# Patient Record
Sex: FEMALE | Race: WHITE | NOT HISPANIC OR LATINO | Employment: FULL TIME
[De-identification: names, ages, dates, MRNs, and addresses within clinical notes are randomized per-mention and may not be internally consistent; named-entity substitution may affect disease eponyms.]

---

## 2020-04-10 ENCOUNTER — TELEPHONE (OUTPATIENT)
Dept: SCHEDULING | Age: 35
End: 2020-04-10

## 2020-04-11 ENCOUNTER — APPOINTMENT (OUTPATIENT)
Dept: FAMILY MEDICINE | Age: 35
End: 2020-04-11

## 2020-04-11 ENCOUNTER — MED INFO FORMS (OUTPATIENT)
Dept: FAMILY MEDICINE | Age: 35
End: 2020-04-11

## 2020-04-11 PROBLEM — J45.909 ASTHMA (CMD): Status: ACTIVE | Noted: 2020-04-11

## 2020-04-11 PROBLEM — R20.2 HAND TINGLING: Status: ACTIVE | Noted: 2020-04-11

## 2020-04-11 RX ORDER — ALBUTEROL SULFATE 90 UG/1
2 AEROSOL, METERED RESPIRATORY (INHALATION) EVERY 4 HOURS PRN
COMMUNITY

## 2020-04-11 RX ORDER — FLUTICASONE PROPIONATE AND SALMETEROL 250; 50 UG/1; UG/1
1 POWDER RESPIRATORY (INHALATION)
COMMUNITY

## 2020-09-21 ENCOUNTER — HOSPITAL (OUTPATIENT)
Dept: OTHER | Age: 35
End: 2020-09-21
Attending: PHYSICIAN ASSISTANT

## 2020-12-15 ENCOUNTER — LAB SERVICES (OUTPATIENT)
Dept: LAB | Age: 35
End: 2020-12-15

## 2020-12-15 DIAGNOSIS — Z01.812 PRE-PROCEDURAL LABORATORY EXAMINATIONS: ICD-10-CM

## 2020-12-15 LAB
SARS-COV-2 RNA RESP QL NAA+PROBE: NOT DETECTED
SERVICE CMNT-IMP: NORMAL
SERVICE CMNT-IMP: NORMAL

## 2020-12-15 PROCEDURE — U0003 INFECTIOUS AGENT DETECTION BY NUCLEIC ACID (DNA OR RNA); SEVERE ACUTE RESPIRATORY SYNDROME CORONAVIRUS 2 (SARS-COV-2) (CORONAVIRUS DISEASE [COVID-19]), AMPLIFIED PROBE TECHNIQUE, MAKING USE OF HIGH THROUGHPUT TECHNOLOGIES AS DESCRIBED BY CMS-2020-01-R: HCPCS | Performed by: ORTHOPAEDIC SURGERY

## 2020-12-17 SDOH — HEALTH STABILITY: MENTAL HEALTH: HOW OFTEN DO YOU HAVE A DRINK CONTAINING ALCOHOL?: MONTHLY OR LESS

## 2020-12-17 SDOH — HEALTH STABILITY: MENTAL HEALTH: HOW MANY STANDARD DRINKS CONTAINING ALCOHOL DO YOU HAVE ON A TYPICAL DAY?: 1 OR 2

## 2020-12-18 ENCOUNTER — ANESTHESIA (OUTPATIENT)
Dept: SURGERY | Age: 35
End: 2020-12-18

## 2020-12-18 ENCOUNTER — HOSPITAL ENCOUNTER (OUTPATIENT)
Age: 35
Discharge: HOME | End: 2020-12-18
Attending: ORTHOPAEDIC SURGERY | Admitting: ORTHOPAEDIC SURGERY

## 2020-12-18 ENCOUNTER — ANESTHESIA EVENT (OUTPATIENT)
Dept: SURGERY | Age: 35
End: 2020-12-18

## 2020-12-18 VITALS
HEIGHT: 72 IN | BODY MASS INDEX: 36.19 KG/M2 | RESPIRATION RATE: 16 BRPM | TEMPERATURE: 97 F | HEART RATE: 68 BPM | SYSTOLIC BLOOD PRESSURE: 118 MMHG | DIASTOLIC BLOOD PRESSURE: 87 MMHG | WEIGHT: 267.2 LBS | OXYGEN SATURATION: 98 %

## 2020-12-18 DIAGNOSIS — Z01.812 PRE-PROCEDURAL LABORATORY EXAMINATIONS: Primary | ICD-10-CM

## 2020-12-18 LAB
B-HCG UR QL: NEGATIVE
INTERNAL PROCEDURAL CONTROLS ACCEPTABLE: YES

## 2020-12-18 PROCEDURE — 13000114 HB ORTHO BASIC CASE S/U + 1ST 15 MIN: Performed by: ORTHOPAEDIC SURGERY

## 2020-12-18 PROCEDURE — 13000002 HB ANESTHESIA  GENERAL  S/U + 1ST 15 MIN: Performed by: ORTHOPAEDIC SURGERY

## 2020-12-18 PROCEDURE — 10002801 HB RX 250 W/O HCPCS: Performed by: INTERNAL MEDICINE

## 2020-12-18 PROCEDURE — 10004451 HB PACU RECOVERY 1ST 30 MINUTES: Performed by: ORTHOPAEDIC SURGERY

## 2020-12-18 PROCEDURE — 13000001 HB PHASE II RECOVERY EA 30 MINUTES: Performed by: ORTHOPAEDIC SURGERY

## 2020-12-18 PROCEDURE — 13000115 HB ORTHO BASIC CASE EA ADD MINUTE: Performed by: ORTHOPAEDIC SURGERY

## 2020-12-18 PROCEDURE — 10002801 HB RX 250 W/O HCPCS: Performed by: ORTHOPAEDIC SURGERY

## 2020-12-18 PROCEDURE — 10006023 HB SUPPLY 272: Performed by: ORTHOPAEDIC SURGERY

## 2020-12-18 PROCEDURE — 10002807 HB RX 258: Performed by: INTERNAL MEDICINE

## 2020-12-18 PROCEDURE — 10004452 HB PACU ADDL 30 MINUTES: Performed by: ORTHOPAEDIC SURGERY

## 2020-12-18 PROCEDURE — 13000003 HB ANESTHESIA  GENERAL EA ADD MINUTE: Performed by: ORTHOPAEDIC SURGERY

## 2020-12-18 PROCEDURE — 10002800 HB RX 250 W HCPCS: Performed by: INTERNAL MEDICINE

## 2020-12-18 PROCEDURE — 81025 URINE PREGNANCY TEST: CPT | Performed by: ORTHOPAEDIC SURGERY

## 2020-12-18 RX ORDER — ACETAMINOPHEN 650 MG/1
650 SUPPOSITORY RECTAL EVERY 4 HOURS PRN
Status: DISCONTINUED | OUTPATIENT
Start: 2020-12-18 | End: 2020-12-18 | Stop reason: HOSPADM

## 2020-12-18 RX ORDER — LIDOCAINE HYDROCHLORIDE 10 MG/ML
INJECTION, SOLUTION INFILTRATION; PERINEURAL PRN
Status: DISCONTINUED | OUTPATIENT
Start: 2020-12-18 | End: 2020-12-18 | Stop reason: HOSPADM

## 2020-12-18 RX ORDER — DEXAMETHASONE SODIUM PHOSPHATE 4 MG/ML
INJECTION, SOLUTION INTRA-ARTICULAR; INTRALESIONAL; INTRAMUSCULAR; INTRAVENOUS; SOFT TISSUE PRN
Status: DISCONTINUED | OUTPATIENT
Start: 2020-12-18 | End: 2020-12-18

## 2020-12-18 RX ORDER — PROPOFOL 10 MG/ML
INJECTION, EMULSION INTRAVENOUS PRN
Status: DISCONTINUED | OUTPATIENT
Start: 2020-12-18 | End: 2020-12-18

## 2020-12-18 RX ORDER — SODIUM CHLORIDE, SODIUM LACTATE, POTASSIUM CHLORIDE, CALCIUM CHLORIDE 600; 310; 30; 20 MG/100ML; MG/100ML; MG/100ML; MG/100ML
10 INJECTION, SOLUTION INTRAVENOUS CONTINUOUS
Status: DISCONTINUED | OUTPATIENT
Start: 2020-12-18 | End: 2020-12-18 | Stop reason: HOSPADM

## 2020-12-18 RX ORDER — LIDOCAINE HYDROCHLORIDE 20 MG/ML
INJECTION, SOLUTION INFILTRATION; PERINEURAL PRN
Status: DISCONTINUED | OUTPATIENT
Start: 2020-12-18 | End: 2020-12-18

## 2020-12-18 RX ORDER — HYDROCODONE BITARTRATE AND ACETAMINOPHEN 5; 325 MG/1; MG/1
1 TABLET ORAL EVERY 6 HOURS PRN
Qty: 15 TABLET | Refills: 0 | Status: ON HOLD | OUTPATIENT
Start: 2020-12-18 | End: 2021-02-26 | Stop reason: HOSPADM

## 2020-12-18 RX ORDER — MIDAZOLAM HYDROCHLORIDE 1 MG/ML
INJECTION, SOLUTION INTRAMUSCULAR; INTRAVENOUS PRN
Status: DISCONTINUED | OUTPATIENT
Start: 2020-12-18 | End: 2020-12-18

## 2020-12-18 RX ORDER — PROCHLORPERAZINE EDISYLATE 5 MG/ML
5 INJECTION INTRAMUSCULAR; INTRAVENOUS EVERY 4 HOURS PRN
Status: DISCONTINUED | OUTPATIENT
Start: 2020-12-18 | End: 2020-12-18 | Stop reason: HOSPADM

## 2020-12-18 RX ORDER — BUPIVACAINE HYDROCHLORIDE 5 MG/ML
INJECTION, SOLUTION EPIDURAL; INTRACAUDAL PRN
Status: DISCONTINUED | OUTPATIENT
Start: 2020-12-18 | End: 2020-12-18 | Stop reason: HOSPADM

## 2020-12-18 RX ORDER — SODIUM CHLORIDE, SODIUM LACTATE, POTASSIUM CHLORIDE, CALCIUM CHLORIDE 600; 310; 30; 20 MG/100ML; MG/100ML; MG/100ML; MG/100ML
INJECTION, SOLUTION INTRAVENOUS CONTINUOUS PRN
Status: DISCONTINUED | OUTPATIENT
Start: 2020-12-18 | End: 2020-12-18

## 2020-12-18 RX ORDER — ACETAMINOPHEN 325 MG/1
650 TABLET ORAL EVERY 4 HOURS PRN
Status: DISCONTINUED | OUTPATIENT
Start: 2020-12-18 | End: 2020-12-18 | Stop reason: HOSPADM

## 2020-12-18 RX ORDER — ROCURONIUM BROMIDE 10 MG/ML
INJECTION, SOLUTION INTRAVENOUS PRN
Status: DISCONTINUED | OUTPATIENT
Start: 2020-12-18 | End: 2020-12-18

## 2020-12-18 RX ORDER — ONDANSETRON 2 MG/ML
INJECTION INTRAMUSCULAR; INTRAVENOUS PRN
Status: DISCONTINUED | OUTPATIENT
Start: 2020-12-18 | End: 2020-12-18

## 2020-12-18 RX ADMIN — PROPOFOL 200 MG: 10 INJECTION, EMULSION INTRAVENOUS at 10:38

## 2020-12-18 RX ADMIN — LIDOCAINE HYDROCHLORIDE 5 ML: 20 INJECTION, SOLUTION INFILTRATION; PERINEURAL at 10:38

## 2020-12-18 RX ADMIN — DEXAMETHASONE SODIUM PHOSPHATE 4 MG: 4 INJECTION, SOLUTION INTRAMUSCULAR; INTRAVENOUS at 10:44

## 2020-12-18 RX ADMIN — Medication 3000 MG: at 10:37

## 2020-12-18 RX ADMIN — MIDAZOLAM HYDROCHLORIDE 2 MG: 1 INJECTION, SOLUTION INTRAMUSCULAR; INTRAVENOUS at 10:30

## 2020-12-18 RX ADMIN — Medication 100 MG: at 10:38

## 2020-12-18 RX ADMIN — FENTANYL CITRATE 50 MCG: 50 INJECTION, SOLUTION INTRAMUSCULAR; INTRAVENOUS at 10:38

## 2020-12-18 RX ADMIN — ROCURONIUM BROMIDE 10 MG: 50 INJECTION, SOLUTION INTRAVENOUS at 10:38

## 2020-12-18 RX ADMIN — SODIUM CHLORIDE, POTASSIUM CHLORIDE, SODIUM LACTATE AND CALCIUM CHLORIDE: 600; 310; 30; 20 INJECTION, SOLUTION INTRAVENOUS at 10:30

## 2020-12-18 RX ADMIN — ONDANSETRON 4 MG: 2 INJECTION INTRAMUSCULAR; INTRAVENOUS at 11:17

## 2020-12-18 SDOH — HEALTH STABILITY: MENTAL HEALTH: HOW MANY STANDARD DRINKS CONTAINING ALCOHOL DO YOU HAVE ON A TYPICAL DAY?: 1 OR 2

## 2020-12-18 SDOH — HEALTH STABILITY: MENTAL HEALTH: HOW OFTEN DO YOU HAVE A DRINK CONTAINING ALCOHOL?: MONTHLY OR LESS

## 2020-12-18 ASSESSMENT — PAIN SCALES - GENERAL
PAINLEVEL_OUTOF10: 3
PAINLEVEL_OUTOF10: 2
PAINLEVEL_OUTOF10: 3

## 2021-02-23 ENCOUNTER — LAB SERVICES (OUTPATIENT)
Dept: LAB | Age: 36
End: 2021-02-23

## 2021-02-23 DIAGNOSIS — Z01.812 PRE-PROCEDURAL LABORATORY EXAMINATIONS: ICD-10-CM

## 2021-02-23 LAB
SARS-COV-2 RNA RESP QL NAA+PROBE: NOT DETECTED
SERVICE CMNT-IMP: NORMAL
SERVICE CMNT-IMP: NORMAL

## 2021-02-23 PROCEDURE — U0005 INFEC AGEN DETEC AMPLI PROBE: HCPCS | Performed by: ORTHOPAEDIC SURGERY

## 2021-02-23 PROCEDURE — U0003 INFECTIOUS AGENT DETECTION BY NUCLEIC ACID (DNA OR RNA); SEVERE ACUTE RESPIRATORY SYNDROME CORONAVIRUS 2 (SARS-COV-2) (CORONAVIRUS DISEASE [COVID-19]), AMPLIFIED PROBE TECHNIQUE, MAKING USE OF HIGH THROUGHPUT TECHNOLOGIES AS DESCRIBED BY CMS-2020-01-R: HCPCS | Performed by: ORTHOPAEDIC SURGERY

## 2021-02-25 ENCOUNTER — ANESTHESIA EVENT (OUTPATIENT)
Dept: SURGERY | Age: 36
End: 2021-02-25

## 2021-02-25 RX ORDER — SODIUM CHLORIDE, SODIUM LACTATE, POTASSIUM CHLORIDE, CALCIUM CHLORIDE 600; 310; 30; 20 MG/100ML; MG/100ML; MG/100ML; MG/100ML
10 INJECTION, SOLUTION INTRAVENOUS CONTINUOUS
Status: CANCELLED | OUTPATIENT
Start: 2021-02-25

## 2021-02-25 SDOH — HEALTH STABILITY: MENTAL HEALTH: HOW MANY STANDARD DRINKS CONTAINING ALCOHOL DO YOU HAVE ON A TYPICAL DAY?: 1 OR 2

## 2021-02-25 SDOH — HEALTH STABILITY: MENTAL HEALTH: HOW OFTEN DO YOU HAVE A DRINK CONTAINING ALCOHOL?: MONTHLY OR LESS

## 2021-02-25 ASSESSMENT — ACTIVITIES OF DAILY LIVING (ADL)
ADL_SCORE: 12
RECENT_DECLINE_ADL: NO
ADL_BEFORE_ADMISSION: INDEPENDENT
HISTORY OF FALLING IN THE LAST YEAR (PRIOR TO ADMISSION): NO
CHRONIC_PAIN_PRESENT: NO
SENSORY_SUPPORT_DEVICES: EYEGLASSES
ADL_SHORT_OF_BREATH: NO
NEEDS_ASSIST: NO

## 2021-02-25 ASSESSMENT — COGNITIVE AND FUNCTIONAL STATUS - GENERAL: ARE YOU DEAF OR DO YOU HAVE SERIOUS DIFFICULTY  HEARING: NO

## 2021-02-26 ENCOUNTER — HOSPITAL ENCOUNTER (OUTPATIENT)
Age: 36
Discharge: HOME | End: 2021-02-26
Attending: ORTHOPAEDIC SURGERY | Admitting: ORTHOPAEDIC SURGERY

## 2021-02-26 ENCOUNTER — ANESTHESIA (OUTPATIENT)
Dept: SURGERY | Age: 36
End: 2021-02-26

## 2021-02-26 DIAGNOSIS — Z01.812 PRE-PROCEDURAL LABORATORY EXAMINATIONS: Primary | ICD-10-CM

## 2021-02-26 LAB
B-HCG UR QL: NEGATIVE
INTERNAL PROCEDURAL CONTROLS ACCEPTABLE: YES

## 2021-02-26 PROCEDURE — 13000115 HB ORTHO BASIC CASE EA ADD MINUTE: Performed by: ORTHOPAEDIC SURGERY

## 2021-02-26 PROCEDURE — 13000002 HB ANESTHESIA  GENERAL  S/U + 1ST 15 MIN: Performed by: ORTHOPAEDIC SURGERY

## 2021-02-26 PROCEDURE — 10002800 HB RX 250 W HCPCS: Performed by: STUDENT IN AN ORGANIZED HEALTH CARE EDUCATION/TRAINING PROGRAM

## 2021-02-26 PROCEDURE — 13000003 HB ANESTHESIA  GENERAL EA ADD MINUTE: Performed by: ORTHOPAEDIC SURGERY

## 2021-02-26 PROCEDURE — 81025 URINE PREGNANCY TEST: CPT | Performed by: ORTHOPAEDIC SURGERY

## 2021-02-26 PROCEDURE — 10002807 HB RX 258: Performed by: STUDENT IN AN ORGANIZED HEALTH CARE EDUCATION/TRAINING PROGRAM

## 2021-02-26 PROCEDURE — 10004451 HB PACU RECOVERY 1ST 30 MINUTES: Performed by: ORTHOPAEDIC SURGERY

## 2021-02-26 PROCEDURE — 10002801 HB RX 250 W/O HCPCS: Performed by: STUDENT IN AN ORGANIZED HEALTH CARE EDUCATION/TRAINING PROGRAM

## 2021-02-26 PROCEDURE — 13000001 HB PHASE II RECOVERY EA 30 MINUTES: Performed by: ORTHOPAEDIC SURGERY

## 2021-02-26 PROCEDURE — 10002801 HB RX 250 W/O HCPCS: Performed by: ORTHOPAEDIC SURGERY

## 2021-02-26 PROCEDURE — 13000114 HB ORTHO BASIC CASE S/U + 1ST 15 MIN: Performed by: ORTHOPAEDIC SURGERY

## 2021-02-26 PROCEDURE — 10006023 HB SUPPLY 272: Performed by: ORTHOPAEDIC SURGERY

## 2021-02-26 PROCEDURE — 10004452 HB PACU ADDL 30 MINUTES: Performed by: ORTHOPAEDIC SURGERY

## 2021-02-26 RX ORDER — HYDROCODONE BITARTRATE AND ACETAMINOPHEN 5; 325 MG/1; MG/1
1 TABLET ORAL EVERY 6 HOURS PRN
Qty: 15 TABLET | Refills: 0 | Status: SHIPPED | OUTPATIENT
Start: 2021-02-26 | End: 2023-06-01 | Stop reason: ALTCHOICE

## 2021-02-26 RX ORDER — ONDANSETRON 2 MG/ML
INJECTION INTRAMUSCULAR; INTRAVENOUS PRN
Status: DISCONTINUED | OUTPATIENT
Start: 2021-02-26 | End: 2021-02-26

## 2021-02-26 RX ORDER — SODIUM CHLORIDE, SODIUM LACTATE, POTASSIUM CHLORIDE, CALCIUM CHLORIDE 600; 310; 30; 20 MG/100ML; MG/100ML; MG/100ML; MG/100ML
INJECTION, SOLUTION INTRAVENOUS CONTINUOUS PRN
Status: DISCONTINUED | OUTPATIENT
Start: 2021-02-26 | End: 2021-02-26

## 2021-02-26 RX ORDER — LIDOCAINE HYDROCHLORIDE 20 MG/ML
INJECTION, SOLUTION INFILTRATION; PERINEURAL PRN
Status: DISCONTINUED | OUTPATIENT
Start: 2021-02-26 | End: 2021-02-26

## 2021-02-26 RX ORDER — PROCHLORPERAZINE EDISYLATE 5 MG/ML
5 INJECTION INTRAMUSCULAR; INTRAVENOUS EVERY 4 HOURS PRN
Status: DISCONTINUED | OUTPATIENT
Start: 2021-02-26 | End: 2021-02-26 | Stop reason: HOSPADM

## 2021-02-26 RX ORDER — PROPOFOL 10 MG/ML
INJECTION, EMULSION INTRAVENOUS PRN
Status: DISCONTINUED | OUTPATIENT
Start: 2021-02-26 | End: 2021-02-26

## 2021-02-26 RX ORDER — MIDAZOLAM HYDROCHLORIDE 1 MG/ML
INJECTION, SOLUTION INTRAMUSCULAR; INTRAVENOUS PRN
Status: DISCONTINUED | OUTPATIENT
Start: 2021-02-26 | End: 2021-02-26

## 2021-02-26 RX ORDER — BUPIVACAINE HYDROCHLORIDE 5 MG/ML
INJECTION, SOLUTION EPIDURAL; INTRACAUDAL PRN
Status: DISCONTINUED | OUTPATIENT
Start: 2021-02-26 | End: 2021-02-26 | Stop reason: HOSPADM

## 2021-02-26 RX ORDER — DEXAMETHASONE SODIUM PHOSPHATE 4 MG/ML
INJECTION, SOLUTION INTRA-ARTICULAR; INTRALESIONAL; INTRAMUSCULAR; INTRAVENOUS; SOFT TISSUE PRN
Status: DISCONTINUED | OUTPATIENT
Start: 2021-02-26 | End: 2021-02-26

## 2021-02-26 RX ADMIN — PROPOFOL 200 MG: 10 INJECTION, EMULSION INTRAVENOUS at 07:34

## 2021-02-26 RX ADMIN — SODIUM CHLORIDE, POTASSIUM CHLORIDE, SODIUM LACTATE AND CALCIUM CHLORIDE: 600; 310; 30; 20 INJECTION, SOLUTION INTRAVENOUS at 07:25

## 2021-02-26 RX ADMIN — FENTANYL CITRATE 25 MCG: 50 INJECTION, SOLUTION INTRAMUSCULAR; INTRAVENOUS at 07:41

## 2021-02-26 RX ADMIN — Medication 30 MG: at 07:52

## 2021-02-26 RX ADMIN — FENTANYL CITRATE 50 MCG: 50 INJECTION, SOLUTION INTRAMUSCULAR; INTRAVENOUS at 07:46

## 2021-02-26 RX ADMIN — Medication 3000 MG: at 07:43

## 2021-02-26 RX ADMIN — DEXAMETHASONE SODIUM PHOSPHATE 4 MG: 4 INJECTION, SOLUTION INTRAMUSCULAR; INTRAVENOUS at 07:52

## 2021-02-26 RX ADMIN — MIDAZOLAM HYDROCHLORIDE 2 MG: 1 INJECTION, SOLUTION INTRAMUSCULAR; INTRAVENOUS at 07:28

## 2021-02-26 RX ADMIN — FENTANYL CITRATE 25 MCG: 50 INJECTION, SOLUTION INTRAMUSCULAR; INTRAVENOUS at 07:34

## 2021-02-26 RX ADMIN — ONDANSETRON 4 MG: 2 INJECTION INTRAMUSCULAR; INTRAVENOUS at 07:52

## 2021-02-26 RX ADMIN — LIDOCAINE HYDROCHLORIDE 5 ML: 20 INJECTION, SOLUTION INFILTRATION; PERINEURAL at 07:34

## 2021-02-26 SDOH — HEALTH STABILITY: MENTAL HEALTH: HOW MANY STANDARD DRINKS CONTAINING ALCOHOL DO YOU HAVE ON A TYPICAL DAY?: 1 OR 2

## 2021-02-26 SDOH — HEALTH STABILITY: MENTAL HEALTH: HOW OFTEN DO YOU HAVE A DRINK CONTAINING ALCOHOL?: MONTHLY OR LESS

## 2021-02-26 ASSESSMENT — PAIN SCALES - GENERAL
PAINLEVEL_OUTOF10: 0
PAINLEVEL_OUTOF10: 0

## 2021-02-26 ASSESSMENT — ENCOUNTER SYMPTOMS: EXERCISE TOLERANCE: GOOD (>4 METS)

## 2021-02-26 ASSESSMENT — COPD QUESTIONNAIRES: COPD: 0

## 2021-05-26 VITALS
HEIGHT: 70 IN | SYSTOLIC BLOOD PRESSURE: 115 MMHG | OXYGEN SATURATION: 98 % | BODY MASS INDEX: 40.94 KG/M2 | WEIGHT: 285.94 LBS | HEART RATE: 70 BPM | TEMPERATURE: 96.8 F | DIASTOLIC BLOOD PRESSURE: 86 MMHG | RESPIRATION RATE: 15 BRPM

## 2023-06-01 ENCOUNTER — IMAGING SERVICES (OUTPATIENT)
Dept: GENERAL RADIOLOGY | Age: 38
End: 2023-06-01
Attending: NURSE PRACTITIONER

## 2023-06-01 ENCOUNTER — WALK IN (OUTPATIENT)
Dept: URGENT CARE | Age: 38
End: 2023-06-01

## 2023-06-01 VITALS
DIASTOLIC BLOOD PRESSURE: 76 MMHG | SYSTOLIC BLOOD PRESSURE: 112 MMHG | TEMPERATURE: 98.2 F | OXYGEN SATURATION: 98 % | HEART RATE: 68 BPM | RESPIRATION RATE: 18 BRPM

## 2023-06-01 DIAGNOSIS — S89.91XA INJURY OF RIGHT KNEE, INITIAL ENCOUNTER: ICD-10-CM

## 2023-06-01 DIAGNOSIS — R22.0 HEAD LUMP: ICD-10-CM

## 2023-06-01 DIAGNOSIS — S80.01XA CONTUSION OF RIGHT KNEE, INITIAL ENCOUNTER: Primary | ICD-10-CM

## 2023-06-01 PROCEDURE — 99203 OFFICE O/P NEW LOW 30 MIN: CPT | Performed by: NURSE PRACTITIONER

## 2023-06-01 ASSESSMENT — ENCOUNTER SYMPTOMS
EYES NEGATIVE: 1
NEUROLOGICAL NEGATIVE: 1
PSYCHIATRIC NEGATIVE: 1
CONSTITUTIONAL NEGATIVE: 1
RESPIRATORY NEGATIVE: 1
GASTROINTESTINAL NEGATIVE: 1

## 2023-06-03 ENCOUNTER — EXTERNAL RECORD (OUTPATIENT)
Dept: HEALTH INFORMATION MANAGEMENT | Facility: OTHER | Age: 38
End: 2023-06-03

## 2023-06-26 ASSESSMENT — ENCOUNTER SYMPTOMS
NERVOUS/ANXIOUS: 0
SORE THROAT: 0
EYE PAIN: 0
ABDOMINAL PAIN: 0
NUMBNESS: 0
WEAKNESS: 0
AGITATION: 0
FATIGUE: 0
WOUND: 0
SLEEP DISTURBANCE: 0
CHILLS: 0
DIARRHEA: 0
DIZZINESS: 0
HEADACHES: 0
WHEEZING: 0
CONSTIPATION: 0
LIGHT-HEADEDNESS: 0
FEVER: 0
SHORTNESS OF BREATH: 0
VOMITING: 0
NAUSEA: 0
CHEST TIGHTNESS: 0
TROUBLE SWALLOWING: 0
BACK PAIN: 0

## 2023-06-28 ENCOUNTER — APPOINTMENT (OUTPATIENT)
Dept: SPORTS MEDICINE | Age: 38
End: 2023-06-28

## 2023-06-29 ASSESSMENT — ENCOUNTER SYMPTOMS
ABDOMINAL PAIN: 0
BACK PAIN: 0
DIZZINESS: 0
SLEEP DISTURBANCE: 0
CHILLS: 0
EYE PAIN: 0
VOMITING: 0
FATIGUE: 0
NERVOUS/ANXIOUS: 0
TROUBLE SWALLOWING: 0
CHEST TIGHTNESS: 0
CONSTIPATION: 0
FEVER: 0
NAUSEA: 0
WOUND: 0
DIARRHEA: 0
NUMBNESS: 0
AGITATION: 0
WHEEZING: 0
LIGHT-HEADEDNESS: 0
HEADACHES: 0
SHORTNESS OF BREATH: 0
SORE THROAT: 0
WEAKNESS: 0

## 2023-07-05 ENCOUNTER — APPOINTMENT (OUTPATIENT)
Dept: SPORTS MEDICINE | Age: 38
End: 2023-07-05

## 2023-07-14 ASSESSMENT — ENCOUNTER SYMPTOMS
CONSTIPATION: 0
BACK PAIN: 0
ABDOMINAL PAIN: 0
UNEXPECTED WEIGHT CHANGE: 0
BLOOD IN STOOL: 0
EYE PAIN: 0
NUMBNESS: 0
CHEST TIGHTNESS: 0
HEADACHES: 0
SINUS PAIN: 0
EYE REDNESS: 0
SHORTNESS OF BREATH: 0
CHILLS: 0
NAUSEA: 0
AGITATION: 0
ACTIVITY CHANGE: 0
FATIGUE: 0
DIAPHORESIS: 0
DIZZINESS: 0
SPEECH DIFFICULTY: 0
CHOKING: 0
PHOTOPHOBIA: 0
RHINORRHEA: 0
COUGH: 0
NERVOUS/ANXIOUS: 0

## 2023-07-18 ASSESSMENT — ENCOUNTER SYMPTOMS
NAUSEA: 0
WHEEZING: 0
WOUND: 0
AGITATION: 0
SLEEP DISTURBANCE: 0
WEAKNESS: 0
FEVER: 0
HEADACHES: 0
DIZZINESS: 0
SHORTNESS OF BREATH: 0
EYE PAIN: 0
NERVOUS/ANXIOUS: 0
BACK PAIN: 0
SORE THROAT: 0
TROUBLE SWALLOWING: 0
ABDOMINAL PAIN: 0
CHILLS: 0
CHEST TIGHTNESS: 0
CONSTIPATION: 0
DIARRHEA: 0
LIGHT-HEADEDNESS: 0
VOMITING: 0
NUMBNESS: 0
FATIGUE: 0

## 2023-07-19 ENCOUNTER — APPOINTMENT (OUTPATIENT)
Dept: SPORTS MEDICINE | Age: 38
End: 2023-07-19

## 2023-08-01 ASSESSMENT — ENCOUNTER SYMPTOMS
VOMITING: 0
SLEEP DISTURBANCE: 0
WOUND: 0
FEVER: 0
WEAKNESS: 0
HEADACHES: 0
TROUBLE SWALLOWING: 0
LIGHT-HEADEDNESS: 0
SHORTNESS OF BREATH: 0
DIARRHEA: 0
ABDOMINAL PAIN: 0
BACK PAIN: 0
NERVOUS/ANXIOUS: 0
NAUSEA: 0
FATIGUE: 0
DIZZINESS: 0
CHILLS: 0
CONSTIPATION: 0
AGITATION: 0
NUMBNESS: 0
EYE PAIN: 0
SORE THROAT: 0
CHEST TIGHTNESS: 0
WHEEZING: 0

## 2023-08-03 ENCOUNTER — APPOINTMENT (OUTPATIENT)
Dept: SPORTS MEDICINE | Age: 38
End: 2023-08-03

## 2023-08-11 ASSESSMENT — ENCOUNTER SYMPTOMS
TROUBLE SWALLOWING: 0
CHEST TIGHTNESS: 0
DIARRHEA: 0
HEADACHES: 0
ABDOMINAL PAIN: 0
FATIGUE: 0
SLEEP DISTURBANCE: 0
WHEEZING: 0
NUMBNESS: 0
FEVER: 0
VOMITING: 0
SORE THROAT: 0
BACK PAIN: 0
SHORTNESS OF BREATH: 0
CONSTIPATION: 0
AGITATION: 0
WEAKNESS: 0
EYE PAIN: 0
WOUND: 0
DIZZINESS: 0
CHILLS: 0
NERVOUS/ANXIOUS: 0
LIGHT-HEADEDNESS: 0
NAUSEA: 0

## 2023-08-16 ENCOUNTER — OFFICE VISIT (OUTPATIENT)
Dept: SPORTS MEDICINE | Age: 38
End: 2023-08-16

## 2023-08-16 VITALS — HEIGHT: 70 IN | WEIGHT: 230 LBS | BODY MASS INDEX: 32.93 KG/M2

## 2023-08-16 DIAGNOSIS — S83.231A COMPLEX TEAR OF MEDIAL MENISCUS OF RIGHT KNEE AS CURRENT INJURY, INITIAL ENCOUNTER: ICD-10-CM

## 2023-08-16 DIAGNOSIS — M25.561 ACUTE PAIN OF RIGHT KNEE: Primary | ICD-10-CM

## 2023-08-16 DIAGNOSIS — M17.11 PRIMARY OSTEOARTHRITIS OF RIGHT KNEE: ICD-10-CM

## 2023-08-16 PROCEDURE — 99204 OFFICE O/P NEW MOD 45 MIN: CPT | Performed by: FAMILY MEDICINE

## 2024-02-22 ENCOUNTER — OFFICE VISIT (OUTPATIENT)
Dept: FAMILY MEDICINE CLINIC | Facility: CLINIC | Age: 39
End: 2024-02-22

## 2024-02-22 ENCOUNTER — HOSPITAL ENCOUNTER (OUTPATIENT)
Dept: GENERAL RADIOLOGY | Age: 39
Discharge: HOME | End: 2024-02-22
Attending: PHYSICIAN ASSISTANT
Payer: COMMERCIAL

## 2024-02-22 VITALS
DIASTOLIC BLOOD PRESSURE: 81 MMHG | BODY MASS INDEX: 36.54 KG/M2 | WEIGHT: 261 LBS | HEIGHT: 70.67 IN | SYSTOLIC BLOOD PRESSURE: 119 MMHG | HEART RATE: 68 BPM

## 2024-02-22 DIAGNOSIS — M25.561 ACUTE PAIN OF RIGHT KNEE: Primary | ICD-10-CM

## 2024-02-22 DIAGNOSIS — I83.90 VARICOSE VEIN: ICD-10-CM

## 2024-02-22 DIAGNOSIS — M25.561 ACUTE PAIN OF RIGHT KNEE: ICD-10-CM

## 2024-02-22 PROCEDURE — 73562 X-RAY EXAM OF KNEE 3: CPT | Performed by: PHYSICIAN ASSISTANT

## 2024-02-22 PROCEDURE — 99202 OFFICE O/P NEW SF 15 MIN: CPT | Performed by: PHYSICIAN ASSISTANT

## 2024-02-22 PROCEDURE — 3008F BODY MASS INDEX DOCD: CPT | Performed by: PHYSICIAN ASSISTANT

## 2024-02-22 PROCEDURE — 3079F DIAST BP 80-89 MM HG: CPT | Performed by: PHYSICIAN ASSISTANT

## 2024-02-22 PROCEDURE — 3074F SYST BP LT 130 MM HG: CPT | Performed by: PHYSICIAN ASSISTANT

## 2024-02-22 RX ORDER — FLUTICASONE PROPIONATE AND SALMETEROL 250; 50 UG/1; UG/1
1 POWDER RESPIRATORY (INHALATION) 2 TIMES DAILY
COMMUNITY

## 2024-02-22 RX ORDER — ALBUTEROL SULFATE 90 UG/1
2 AEROSOL, METERED RESPIRATORY (INHALATION) EVERY 4 HOURS PRN
COMMUNITY

## 2024-02-22 RX ORDER — NAPROXEN 500 MG/1
500 TABLET ORAL 2 TIMES DAILY WITH MEALS
Qty: 60 TABLET | Refills: 0 | Status: SHIPPED | OUTPATIENT
Start: 2024-02-22

## 2024-02-22 NOTE — PROGRESS NOTES
HPI:     HPI  38 year-old female is here in the office complaining of right leg pain for the past 2 weeks. The pain radiates down to calf. Patient denies of fever, leg swelling, redness, numbness and tingling sensation, weakness.    Medications:     Current Outpatient Medications   Medication Sig Dispense Refill    albuterol 108 (90 Base) MCG/ACT Inhalation Aero Soln Inhale 2 puffs into the lungs every 4 (four) hours as needed.      naproxen 500 MG Oral Tab Take 1 tablet (500 mg total) by mouth 2 (two) times daily with meals. 60 tablet 0    fluticasone-salmeterol 250-50 MCG/ACT Inhalation Aerosol Powder, Breath Activated Inhale 1 puff into the lungs 2 (two) times daily. (Patient not taking: Reported on 2/22/2024)         Allergies:   No Known Allergies    History:     Health Maintenance   Topic Date Due    Annual Physical  Never done    Pap Smear  Never done    Annual Depression Screening  Never done    Influenza Vaccine (1) 06/30/2024 (Originally 10/1/2023)    DTaP,Tdap,and Td Vaccines (2 - Td or Tdap) 01/11/2026    COVID-19 Vaccine  Completed    Pneumococcal Vaccine: Birth to 64yrs  Aged Out       Patient's last menstrual period was 02/12/2024 (approximate).   Past Medical History:   History reviewed. No pertinent past medical history.    Past Surgical History:   History reviewed. No pertinent surgical history.    Family History:   History reviewed. No pertinent family history.    Social History:     Social History     Socioeconomic History    Marital status: Unknown     Spouse name: Not on file    Number of children: Not on file    Years of education: Not on file    Highest education level: Not on file   Occupational History    Not on file   Tobacco Use    Smoking status: Never    Smokeless tobacco: Never   Vaping Use    Vaping Use: Never used   Substance and Sexual Activity    Alcohol use: Yes     Comment: rarely    Drug use: Never    Sexual activity: Not on file   Other Topics Concern    Not on file   Social  History Narrative    Not on file     Social Determinants of Health     Financial Resource Strain: Not on file   Food Insecurity: Not on file   Transportation Needs: Not on file   Physical Activity: Not on file   Stress: Not on file   Social Connections: Not on file   Housing Stability: Not on file       Review of Systems:   Review of Systems   + right leg pain.    Vitals:    02/22/24 1335   BP: 119/81   Pulse: 68   Weight: 261 lb (118.4 kg)   Height: 5' 10.67\" (1.795 m)     Body mass index is 36.74 kg/m².    Physical Exam:   Physical Exam  Vitals reviewed.      There are varicose veins on the back of leg.     Assessment and Plan::     Problem List Items Addressed This Visit    None  Visit Diagnoses       Acute pain of right knee    -  Primary    Relevant Medications    naproxen 500 MG Oral Tab    Other Relevant Orders    XR KNEE (3 VIEWS), RIGHT (CPT=73562) (Completed)    Varicose vein        Relevant Orders    Vascular Surgery - In Network          Discussed plan of care with pt and pt is in agreement.All questions answered. Pt to call with questions or concerns.

## 2024-03-04 ENCOUNTER — PATIENT MESSAGE (OUTPATIENT)
Dept: FAMILY MEDICINE CLINIC | Facility: CLINIC | Age: 39
End: 2024-03-04

## 2024-03-04 NOTE — TELEPHONE ENCOUNTER
From: Salome Salamanca  To: Lavell Rivas  Sent: 3/4/2024 3:32 PM CST  Subject: Referral    Hi    I called to schedule the cardio vascular appointment and the office said they did not have the referral. The fax number is [phone] to [facility].

## 2024-03-13 ENCOUNTER — OFFICE VISIT (OUTPATIENT)
Dept: PODIATRY CLINIC | Facility: CLINIC | Age: 39
End: 2024-03-13

## 2024-03-13 DIAGNOSIS — M79.671 RIGHT FOOT PAIN: ICD-10-CM

## 2024-03-13 DIAGNOSIS — M21.6X1 EQUINUS DEFORMITY OF RIGHT FOOT: ICD-10-CM

## 2024-03-13 DIAGNOSIS — M72.2 PLANTAR FASCIITIS OF RIGHT FOOT: Primary | ICD-10-CM

## 2024-03-13 PROCEDURE — 99203 OFFICE O/P NEW LOW 30 MIN: CPT | Performed by: PODIATRIST

## 2024-03-13 NOTE — PROGRESS NOTES
[facility]  Progress Note    Salome Salamanca is a 38 year old female.   Chief Complaint   Patient presents with    Heel Pain     New pt- R heel- stated plantar fascitis- rates pain 6-8/10 all the time         HPI:     This is a pleasant female that presents to clinic today due to right plantar fascitis pain.  She was prescribed medrol dose pack by a different podiatrist which helped for a few weeks.  She did try night splint as well which helps slightly.   She is taking naproxen which does help.        Allergies: Patient has no known allergies.   Current Outpatient Medications   Medication Sig Dispense Refill    albuterol 108 (90 Base) MCG/ACT Inhalation Aero Soln Inhale 2 puffs into the lungs every 4 (four) hours as needed.      fluticasone-salmeterol 250-50 MCG/ACT Inhalation Aerosol Powder, Breath Activated Inhale 1 puff into the lungs 2 (two) times daily. (Patient not taking: Reported on 2/22/2024)      naproxen 500 MG Oral Tab Take 1 tablet (500 mg total) by mouth 2 (two) times daily with meals. 60 tablet 0      No past medical history on file.   No past surgical history on file.   No family history on file.   Social History     Socioeconomic History    Marital status: Unknown   Tobacco Use    Smoking status: Never    Smokeless tobacco: Never   Vaping Use    Vaping Use: Never used   Substance and Sexual Activity    Alcohol use: Yes     Comment: rarely    Drug use: Never           REVIEW OF SYSTEMS:   Denies nausea, fever, chills  No calf pain  No other muscle or joint aches  Denies chest pain or shortness of breath.      EXAM:   [facility] 02/12/2024 (Approximate)     Constitution: Well-developed and well-nourished. Gait appears normal. No apparent distress. Alert and oriented to person, place, and time.  Integument: There are no varicosities. Skin appears moist, warm, and supple with positive hair growth. There are no color changes. No open lesions. No macerations, No Hyperkeratotic lesions.  Vascular  examination: Dorsalis pedis and posterior tibial pulses are strong bilaterally with capillary filling time less than 3 seconds to all digits. There is no peripheral edema.  Neurological Sensorium: Grossly intact to sharp/dull. Vibratory: Intact.  Musculoskeletal:   5/5 pedal muscle strength b/l.  Pain on compression to right plantar calcaneal tubercle at Plantar fascial insertion site.  Decreased right ankle DF with knee extended approx 0 degrees       LABS & IMAGING:   No results found for: \"GLU\", \"BUN\", \"CREATSERUM\", \"BUNCREA\", \"ANIONGAP\", \"GFRAA\", \"GFRNAA\", \"CA\", \"NA\", \"K\", \"CL\", \"CO2\", \"OSMOCALC\"     No results found for: \"EAG\", \"A1C\"     No results found.     ASSESSMENT AND PLAN:   Diagnoses and all orders for this visit:    Plantar fasciitis of right foot    Right foot pain    Equinus deformity of right foot        Plan:     I have reviewed clinical findings and diagnosis related to condition with the patient in detail.  Basic mechanical principles reviewed. Discussed potential etiology and treatment options.  Explained that the plantar fascia is a connective tissue/ligament on the bottom of the foot.   This is an overuse injury and progress is often slow/gradual.   Discussed importance of managing the inflammation and biomechanical issues as well as the importance of adhering to the conservative treatment instructions given.  Pt educated on proper footwear and importance of supportive shoes.   I have recommended OTC supplemental arch supports such as Superfeet or Spenco prefab orthotics.  We discussed options for OTC vs. Prescriptions NSAID's and GI precautions reviewed.  Recommendations for ice/gel pack to affected area 2-3 times daily and especially after activity or when symptoms are most prevalent.   Modify activity according to tolerance of pain/symptoms.  Additionally, cortisone injections, physical therapy, and immobilization can be considered.      Pt would like to hold off on steroid  injection  Prescribed custom orthotics  Prescribed PT    RTC 2 months for re evaluation.      No follow-ups on file.    Tulio Huizar DPM  3/13/2024

## 2024-03-18 ENCOUNTER — TELEPHONE (OUTPATIENT)
Facility: CLINIC | Age: 39
End: 2024-03-18

## 2024-03-20 ENCOUNTER — OFFICE VISIT (OUTPATIENT)
Facility: CLINIC | Age: 39
End: 2024-03-20

## 2024-03-20 VITALS — RESPIRATION RATE: 20 BRPM | WEIGHT: 261 LBS | HEIGHT: 70.67 IN | BODY MASS INDEX: 36.54 KG/M2

## 2024-03-20 DIAGNOSIS — I83.813 VARICOSE VEINS OF BILATERAL LOWER EXTREMITIES WITH PAIN: Primary | ICD-10-CM

## 2024-03-20 NOTE — PROGRESS NOTES
VASCULAR SURGERY CONSULT NOTE      Salome Salamanca   :  4/3/1985  MR#  [id number]    REFERRING PHYSICIAN:  Lavell Rivas  PRIMARY CARE PHYSICIAN:  No primary care provider on file.    Chief Complaint   Patient presents with    Consult    Varicose Veins       HPI:    The patient is a 38 year old female who has been referred to the clinic today for an evaluation of her bilateral lower extremity heaviness, tiredness, itchiness, edema and pain after prolonged standing. This is interfering with her activities of daily living and exercise. She has not worn compression stockings in the recent past. No history of DVT or thrombophlebitis.     PAST MEDICAL HISTORY:   No past medical history on file.    PAST SURGICAL HISTORY:   No past surgical history on file.     MEDICATIONS:     Current Outpatient Medications   Medication Sig Dispense Refill    albuterol 108 (90 Base) MCG/ACT Inhalation Aero Soln Inhale 2 puffs into the lungs every 4 (four) hours as needed.      fluticasone-salmeterol 250-50 MCG/ACT Inhalation Aerosol Powder, Breath Activated Inhale 1 puff into the lungs 2 (two) times daily.      naproxen 500 MG Oral Tab Take 1 tablet (500 mg total) by mouth 2 (two) times daily with meals. 60 tablet 0       ALLERGIES:   No Known Allergies    SOCIAL HISTORY:     Social History     Socioeconomic History    Marital status: Unknown   Tobacco Use    Smoking status: Never    Smokeless tobacco: Never   Vaping Use    Vaping Use: Never used   Substance and Sexual Activity    Alcohol use: Yes     Comment: rarely    Drug use: Never        FAMILY HISTORY:   No family history on file.    ROS:   A 12 point review of systems with pertinent positives and negatives listed in the HPI.    PHYSICAL EXAM:   Resp 20   Ht 5' 10.67\" (1.795 m)   Wt 261 lb (118.4 kg)   LMP 2024 (Approximate)   BMI 36.74 kg/m²   GENERAL: alert and orientated X 3, well developed, well nourished, in no apparent distress  HEENT: ears and throat are  clear  NECK: supple, no lymphadenopathy, thyroid wnl  CAROTID: No bruits  RESPIRATORY: no rales, rhonchi, or wheezes B  CARDIO: RRR without murmur, no murmur, no gallop   ABDOMEN: soft, non-tender with no palpable aneurysm or masses  BACK: normal, no tenderness  SKIN: no rashes, warm and dry  NEURO/PSYCH: orientated x3, normal mood and affect, no sensory or motor deficit  EXTREMITIES: full range of motion, no tenderness or edema in either leg.   VASCULAR  Pulse exam right: femoral 2+, DP  2+, PT  2+  Pulse exam left: femoral  2+, DP  2+, PT  2+      Vein Assessment:      Mild scattered bilateral  LE varicose veins with no significant hemosiderin deposition    IMPRESSION:   Bilateral  lower extremity pain due to venous insufficiency.   Symptomatic calf varicosities.    PLAN:     We reviewed the options for management for venous insufficiency, including conservative therapy, sclerotherapy, stab phlebectomy, and endovenous laser ablation. The patient was educated in the benign condition of venous disease.  I have given the patient a prescription for Grade II compression stockings (20-30 mmHg, thigh-highs). We reviewed the importance of wearing these daily and consistently. We also reviewed other types of conservative measures, such as periodic leg elevation, walking for exercise, analgesic use, attempts at weight loss, and the avoidance of prolonged standing.  I have sent the patient for a venous reflux ultrasound. Should the ultrasound study reveal venous reflux with dilation and she does not have relief of symptoms with conservative therapy, then endovenous laser ablation may a possible treatment option.  I explained to the patient that her insurance company may require a 6-12 week trial period of conservative therapy prior to authorizing venous abalation treatment.  The patient understood and agreed to proceed with this treatment plan, all of her questions were answered during this clinic visit. She was asked to FU  in several weeks to assess her response to conservative treatment.      Thank you for allowing to participate in the care of your patient.     MAGGI Miller  Division of Vascular Surgery with Dr. Najjar.

## 2024-04-09 ENCOUNTER — PATIENT MESSAGE (OUTPATIENT)
Dept: FAMILY MEDICINE CLINIC | Facility: CLINIC | Age: 39
End: 2024-04-09

## 2024-04-09 ENCOUNTER — TELEPHONE (OUTPATIENT)
Dept: PHYSICAL THERAPY | Facility: HOSPITAL | Age: 39
End: 2024-04-09

## 2024-04-09 NOTE — TELEPHONE ENCOUNTER
From: Salome Salamanca  To: Monse Greene  Sent: 4/9/2024 11:12 AM CDT  Subject: Appointment     Hello,    I missed my physical appointment yesterday, my phone was not working right and my calendar did not set the reminder for me. Can I please reschedule to have my physical with Dr. Monse Camejo?

## 2024-04-10 ENCOUNTER — APPOINTMENT (OUTPATIENT)
Dept: PHYSICAL THERAPY | Age: 39
End: 2024-04-10
Attending: PODIATRIST
Payer: COMMERCIAL

## 2024-04-11 ENCOUNTER — OFFICE VISIT (OUTPATIENT)
Dept: INTERNAL MEDICINE CLINIC | Facility: CLINIC | Age: 39
End: 2024-04-11
Payer: COMMERCIAL

## 2024-04-11 ENCOUNTER — NURSE TRIAGE (OUTPATIENT)
Dept: FAMILY MEDICINE CLINIC | Facility: CLINIC | Age: 39
End: 2024-04-11

## 2024-04-11 VITALS
HEART RATE: 73 BPM | TEMPERATURE: 98 F | OXYGEN SATURATION: 97 % | SYSTOLIC BLOOD PRESSURE: 129 MMHG | BODY MASS INDEX: 35.7 KG/M2 | HEIGHT: 71 IN | DIASTOLIC BLOOD PRESSURE: 85 MMHG | WEIGHT: 255 LBS | RESPIRATION RATE: 16 BRPM

## 2024-04-11 DIAGNOSIS — J45.41 MODERATE PERSISTENT ASTHMA WITH ACUTE EXACERBATION (HCC): Primary | ICD-10-CM

## 2024-04-11 PROCEDURE — 3008F BODY MASS INDEX DOCD: CPT | Performed by: NURSE PRACTITIONER

## 2024-04-11 PROCEDURE — 3074F SYST BP LT 130 MM HG: CPT | Performed by: NURSE PRACTITIONER

## 2024-04-11 PROCEDURE — 3079F DIAST BP 80-89 MM HG: CPT | Performed by: NURSE PRACTITIONER

## 2024-04-11 PROCEDURE — 99204 OFFICE O/P NEW MOD 45 MIN: CPT | Performed by: NURSE PRACTITIONER

## 2024-04-11 RX ORDER — FLUTICASONE PROPIONATE AND SALMETEROL 250; 50 UG/1; UG/1
1 POWDER RESPIRATORY (INHALATION) 2 TIMES DAILY
Qty: 1 EACH | Refills: 0 | Status: SHIPPED | OUTPATIENT
Start: 2024-04-11

## 2024-04-11 RX ORDER — PREDNISONE 20 MG/1
TABLET ORAL
Qty: 10 TABLET | Refills: 0 | Status: SHIPPED | OUTPATIENT
Start: 2024-04-11

## 2024-04-11 RX ORDER — ALBUTEROL SULFATE 90 UG/1
2 AEROSOL, METERED RESPIRATORY (INHALATION) EVERY 4 HOURS PRN
Qty: 8 G | Refills: 0 | Status: SHIPPED | OUTPATIENT
Start: 2024-04-11

## 2024-04-11 NOTE — TELEPHONE ENCOUNTER
Action Requested: Summary for Provider     []  Critical Lab, Recommendations Needed  [] Need Additional Advice  []   FYI    []   Need Orders  [] Need Medications Sent to Pharmacy  []  Other     SUMMARY: Appointment scheduled today per protocol, advised ED for worsening shortness of breath and wheezes.       Future Appointments   Date Time Provider Department Center   4/11/2024  1:20 PM Glenn Hicks MD [facility]       Reason for call: Breathing Problem  Onset: 3 days       Reported trouble breathing x 3 days, , hx asthma,occasional wheezes especially at night ,has been using the albuterol inhaler four times a day .  She thinks that the inhaler is not working anymore .   Currently not in respiratory distress, no wheezing .    Reason for Disposition   MILD difficulty breathing (e.g., minimal/no SOB at rest, SOB with walking, pulse < 100) of new-onset or worse than normal    Protocols used: Breathing Difficulty-A-OH

## 2024-04-11 NOTE — PROGRESS NOTES
Salome Salamanca is a 39 year old female.  Chief Complaint   Patient presents with    Wheezing     With cough     HPI:   She presents with SOB, wheezing, cough and chest tightness.   She has been using her albuterol inhaler about 6-8 times a day.   Her symptoms started 3 days ago. She has a history of asthma.   She was previously on Advair but it has not using it due to cost. She now has different insurance. She denies any fevers.     Current Outpatient Medications   Medication Sig Dispense Refill    fluticasone-salmeterol 250-50 MCG/ACT Inhalation Aerosol Powder, Breath Activated Inhale 1 puff into the lungs 2 (two) times daily. 1 each 0    albuterol 108 (90 Base) MCG/ACT Inhalation Aero Soln Inhale 2 puffs into the lungs every 4 (four) hours as needed. 8 g 0    predniSONE 20 MG Oral Tab Take 2 tablets once a day for 5 days 10 tablet 0    naproxen 500 MG Oral Tab Take 1 tablet (500 mg total) by mouth 2 (two) times daily with meals. 60 tablet 0      History reviewed. No pertinent past medical history.   History reviewed. No pertinent surgical history.   Social History:  Social History     Socioeconomic History    Marital status: Unknown   Tobacco Use    Smoking status: Never    Smokeless tobacco: Never   Vaping Use    Vaping status: Never Used   Substance and Sexual Activity    Alcohol use: Yes     Comment: rarely    Drug use: Never     Social Determinants of Health     Physical Activity: Unknown (4/11/2020)    Received from [employer] [employer]    Exercise Vital Sign     Days of Exercise per Week: 0 days      History reviewed. No pertinent family history.   No Known Allergies     REVIEW OF SYSTEMS:     Review of Systems   Constitutional:  Negative for fever.   HENT:  Negative for congestion, rhinorrhea and sore throat.    Respiratory:  Positive for cough, chest tightness, shortness of breath and wheezing.    Cardiovascular:  Negative for chest pain.   Gastrointestinal:  Negative for abdominal  pain.   Genitourinary: Negative.    Musculoskeletal:  Negative for arthralgias.   Skin: Negative.    Neurological:  Negative for headaches.   Psychiatric/Behavioral: Negative.        Wt Readings from Last 5 Encounters:   04/11/24 255 lb (115.7 kg)   03/20/24 261 lb (118.4 kg)   02/22/24 261 lb (118.4 kg)     Body mass index is 35.57 kg/m².      EXAM:   /85   Pulse 73   Temp 98 °F (36.7 °C)   Resp 16   Ht 5' 11\" (1.803 m)   Wt 255 lb (115.7 kg)   LMP 02/12/2024 (Approximate)   SpO2 97%   BMI 35.57 kg/m²     Physical Exam  Vitals reviewed.   Constitutional:       Appearance: Normal appearance.   HENT:      Head: Normocephalic.   Cardiovascular:      Rate and Rhythm: Normal rate and regular rhythm.      Pulses: Normal pulses.   Pulmonary:      Breath sounds: Wheezing present.   Musculoskeletal:         General: No swelling. Normal range of motion.   Skin:     General: Skin is warm and dry.   Neurological:      Mental Status: She is alert and oriented to person, place, and time.   Psychiatric:         Mood and Affect: Mood normal.         Behavior: Behavior normal.            ASSESSMENT AND PLAN:   1. Moderate persistent asthma with acute exacerbation (HCC)  - fluticasone-salmeterol 250-50 MCG/ACT Inhalation Aerosol Powder, Breath Activated; Inhale 1 puff into the lungs 2 (two) times daily.  Dispense: 1 each; Refill: 0  - albuterol 108 (90 Base) MCG/ACT Inhalation Aero Soln; Inhale 2 puffs into the lungs every 4 (four) hours as needed.  Dispense: 8 g; Refill: 0  - predniSONE 20 MG Oral Tab; Take 2 tablets once a day for 5 days  Dispense: 10 tablet; Refill: 0        The patient indicates understanding of these issues and agrees to the plan.  Return for if symptoms do not resolve.

## 2024-04-16 ENCOUNTER — HOSPITAL ENCOUNTER (OUTPATIENT)
Dept: ULTRASOUND IMAGING | Facility: HOSPITAL | Age: 39
Discharge: HOME | End: 2024-04-16
Payer: COMMERCIAL

## 2024-04-16 DIAGNOSIS — I83.813 VARICOSE VEINS OF BILATERAL LOWER EXTREMITIES WITH PAIN: ICD-10-CM

## 2024-04-16 PROCEDURE — 93970 EXTREMITY STUDY: CPT

## 2024-04-17 ENCOUNTER — OFFICE VISIT (OUTPATIENT)
Dept: PHYSICAL THERAPY | Age: 39
End: 2024-04-17
Attending: PODIATRIST
Payer: COMMERCIAL

## 2024-04-17 ENCOUNTER — APPOINTMENT (OUTPATIENT)
Dept: PHYSICAL THERAPY | Age: 39
End: 2024-04-17
Attending: PODIATRIST
Payer: COMMERCIAL

## 2024-04-17 ENCOUNTER — OFFICE VISIT (OUTPATIENT)
Dept: INTERNAL MEDICINE CLINIC | Facility: CLINIC | Age: 39
End: 2024-04-17

## 2024-04-17 VITALS
RESPIRATION RATE: 20 BRPM | OXYGEN SATURATION: 97 % | HEIGHT: 70.5 IN | SYSTOLIC BLOOD PRESSURE: 128 MMHG | WEIGHT: 256.63 LBS | HEART RATE: 72 BPM | BODY MASS INDEX: 36.33 KG/M2 | DIASTOLIC BLOOD PRESSURE: 85 MMHG

## 2024-04-17 DIAGNOSIS — Z00.00 ANNUAL PHYSICAL EXAM: Primary | ICD-10-CM

## 2024-04-17 DIAGNOSIS — M72.2 PLANTAR FASCIITIS OF RIGHT FOOT: Primary | ICD-10-CM

## 2024-04-17 DIAGNOSIS — J45.41 MODERATE PERSISTENT ASTHMA WITH ACUTE EXACERBATION (HCC): ICD-10-CM

## 2024-04-17 DIAGNOSIS — M79.671 RIGHT FOOT PAIN: ICD-10-CM

## 2024-04-17 DIAGNOSIS — Z13.1 SCREENING FOR DIABETES MELLITUS: ICD-10-CM

## 2024-04-17 DIAGNOSIS — M21.6X1 EQUINUS DEFORMITY OF RIGHT FOOT: ICD-10-CM

## 2024-04-17 PROBLEM — R20.2 HAND TINGLING: Status: ACTIVE | Noted: 2020-04-11

## 2024-04-17 PROBLEM — J45.909 ASTHMA (HCC): Status: ACTIVE | Noted: 2020-04-11

## 2024-04-17 PROCEDURE — 96127 BRIEF EMOTIONAL/BEHAV ASSMT: CPT | Performed by: INTERNAL MEDICINE

## 2024-04-17 PROCEDURE — 90677 PCV20 VACCINE IM: CPT | Performed by: INTERNAL MEDICINE

## 2024-04-17 PROCEDURE — 97530 THERAPEUTIC ACTIVITIES: CPT | Performed by: PHYSICAL THERAPIST

## 2024-04-17 PROCEDURE — 99395 PREV VISIT EST AGE 18-39: CPT | Performed by: INTERNAL MEDICINE

## 2024-04-17 PROCEDURE — 97161 PT EVAL LOW COMPLEX 20 MIN: CPT | Performed by: PHYSICAL THERAPIST

## 2024-04-17 PROCEDURE — 3079F DIAST BP 80-89 MM HG: CPT | Performed by: INTERNAL MEDICINE

## 2024-04-17 PROCEDURE — 90471 IMMUNIZATION ADMIN: CPT | Performed by: INTERNAL MEDICINE

## 2024-04-17 PROCEDURE — 3074F SYST BP LT 130 MM HG: CPT | Performed by: INTERNAL MEDICINE

## 2024-04-17 PROCEDURE — 3008F BODY MASS INDEX DOCD: CPT | Performed by: INTERNAL MEDICINE

## 2024-04-17 RX ORDER — FLUTICASONE PROPIONATE AND SALMETEROL 250; 50 UG/1; UG/1
1 POWDER RESPIRATORY (INHALATION) 2 TIMES DAILY
Qty: 1 EACH | Refills: 3 | Status: SHIPPED | OUTPATIENT
Start: 2024-04-17

## 2024-04-17 NOTE — PROGRESS NOTES
LOWER EXTREMITY EVALUATION:     Diagnosis:   Plantar fasciitis of right foot (M72.2)  Right foot pain (M79.671)  Equinus deformity of right foot (M21.6X1)      Referring Provider: Tulio Huizar DPM Date of Evaluation:    4/17/2024    Precautions:  None    Per MD: Please treat right plantar fascitis and equinus with stretching and US therapy  Next MD visit:   none scheduled  Date of Surgery: n/a     PATIENT SUMMARY   Salome Salamanca is a 39 year old female who presents to therapy today with complaints of R-sided plantar fasciitis that started 1 year ago. Pt reports pain at night that is \"spasmy, pulsating shock-like\" in nature on the base and arch of her foot that go away quickly as it comes about. Pain also experiences pain with the first step she takes when she gets up from bed. Pt works for the [employer] [facility] [employer] which requires her to stand for 8-10 hours/day.  Pt describes pain level current 6/10, at best 5/10, at worst 9/10.   Current functional limitations include inability to stand for work 8-10 hours/day, walk for long distances as well as negotiating stairs (without feeling a sense of heaviness).    Salome describes prior level of function as good. Pt goals include standing for 8-10 hours, walking community distances, negotiating stairs as well as decreasing pain.  Past medical history was reviewed with Salome.     ASSESSMENT  Salome presents to physical therapy evaluation with primary c/o R-sided plantar fasciitis. The results of the objective tests and measures show decreased R ankle ROM, hip stabilizer and ankle strength, LE muscle length and talocrural joint mobility restrictions as well as gait abnormalities. Functional deficits include but are not limited to inability to stand for work 8-10 hours/day, walk for long distances as well as negotiating stairs (without feeling a sense of heaviness).  Signs and symptoms are consistent with diagnosis of Plantar fasciitis of right foot (M72.2), Right foot  pain (M79.671), Equinus deformity of right foot (M21.6X1). Pt and PT discussed evaluation findings, pathology, POC and HEP.  Pt voiced understanding and performs HEP correctly without reported pain. Skilled Physical Therapy is medically necessary to address the above impairments and reach functional goals.     OBJECTIVE:   Observation/Posture: WB to the LLE, R gastroc atrophy  Palpation: TTP: R :  Achilles Tendon  Plantar Fascia  Gastrocs/Soleus    Sensation: WNL    AROM: (* denotes performed with pain)  Foot/Ankle   DF: R 5*/20; L 10/20  PF: R 15*/55; L 30/55  INV: R 15*/30; L 30*/30  EV: R 15/18; L 3/18     Accessory motion: R  Talocrural Distraction: mild hypomobility  T-C posterior glide: mild hypomobility  T-C anterior glide: mild hypomobility    Flexibility: Ankle   R L   Gastrocs moderately restricted moderately restricted   Soleus moderately restricted moderately restricted   Anterior Tibialis moderately restricted moderately restricted   Posterior Tibalis moderately restricted moderately restricted   Peroneals moderately restricted moderately restricted   Hamstrings moderately restricted moderately restricted       Strength/MMT: (* denotes performed with pain)  Hip Knee Foot/Ankle   Flexion: R 4/5; L 4/5  Abduction: R 3+/5; L 3+/5  ER: R 4-/5; L 4/5  IR: R 4/5; L 4/5 Flexion: R 4/5; L 5/5  Extension: R 4/5; L 5/5    DF: R 4/5; L 5/5  PF: R 4/5; L 5/5  INV: R 4/5; L 5/5  EV: R 4/5; L 5/5     Special tests:   R  Anterior Drawer Test: (-)    Gait: pt ambulates on level ground with antalgia, decreased step length R, decreased stance phase R, decreased hip/knee flex or ext R, and trendelenburg/waddle    Balance: SLS: R unable to balance, L 2 sec    Today’s Treatment and Response:   Pt education was provided on exam findings, treatment diagnosis, treatment plan, expectations, and prognosis. Pt was also provided recommendations for possible soreness after evaluation and shoe wear.    Patient was instructed in and  issued a HEP for: Access Code: [email or web address]  URL: [email or web address]  Date: 04/17/2024  Prepared by: Dyan Perez    Exercises  - Long Sitting Ankle Inversion with Resistance  - 1 x daily - 7 x weekly - 3 sets - 10 reps  - Long Sitting Ankle Eversion with Resistance  - 1 x daily - 7 x weekly - 3 sets - 10 reps  - Long Sitting Ankle Plantar Flexion with Resistance  - 1 x daily - 7 x weekly - 3 sets - 10 reps  - Seated Calf Towel Stretch  - 1 x daily - 7 x weekly - 3 sets - 30 second hold  - Bridge with Hip Abduction and Resistance - Ground Touches  - 1 x daily - 7 x weekly - 3 sets - 10 reps  - Towel Scrunches  - 1 x daily - 7 x weekly - 3 sets - 10 reps    Manual Therapy: x5'  STW: R Achilles Tendon, Plantar Fascia, Gastrocs/Soleus  Grade I-II Talocrural Distraction    Charges: PT Eval Low Complexity      Total Timed Treatment: 35 min     Total Treatment Time: 35 min   Therex: 0 minutes  Theract: 10 minutes  NMR: 0 minutes  Manual Therapy: 5 minutes    Based on clinical rationale and outcome measures, this evaluation involved Low Complexity decision making due to 1-2 personal factors/comorbidities, body structures involved/activity limitations, and unstable symptoms including changing pain levels.    PLAN OF CARE:    Goals: (to be met in 8 visits)  Pt will demonstrate improved DF AROM to >= 10 degrees to promote proper foot clearance during gait and greater ease descending stairs without compensation  Pt will have increased ankle strength to 5/5 throughout for improved ankle control with ADLs such as prolonged gait and stair negotiation (  Pt will have improved SLS to >30s for increased ankle stability with ambulation on uneven surfaces such as gravel and grass   Pt will report <2/10 pain with work and home activities such as standing for 8-10 hours/day for work   Pt will be independent and compliant with comprehensive HEP to maintain progress achieved in PT      Frequency / Duration: Patient will  be seen for 2 x/week or a total of 8 visits over a 90 day period. Treatment will include: Gait training, Manual Therapy, Neuromuscular Re-education, Self-Care Home Management, Therapeutic Activities, Therapeutic Exercise, and Home Exercise Program instruction    Education or treatment limitation: None  Rehab Potential:good    Patient/Family/Caregiver was advised of these findings, precautions, and treatment options and has agreed to actively participate in planning and for this course of care.    Thank you for your referral. Please co-sign or sign and return this letter via fax as soon as possible to [phone]. If you have any questions, please contact me at Dept: [phone]    Sincerely,  Electronically signed by therapist: Dyan Perez PT  Physician's certification required: Yes  I certify the need for these services furnished under this plan of treatment and while under my care.    X___________________________________________________ Date____________________    Certification From: 4/5/2024  To:7/4/2024

## 2024-04-17 NOTE — PROGRESS NOTES
Salome Salamanca is a 39 year old female.  Chief Complaint   Patient presents with    Physical     HPI:    Patient presented today for annual physical. She states that she was seen at urgent care last week for wheezing and shortness of breath and was started on advair along with prednisone. She is feeling much better now. Breathing has improved.    Quit smoking 10 months ago.   No family history of cancer      Current Outpatient Medications   Medication Sig Dispense Refill    fluticasone-salmeterol 250-50 MCG/ACT Inhalation Aerosol Powder, Breath Activated Inhale 1 puff into the lungs 2 (two) times daily. 1 each 3    albuterol 108 (90 Base) MCG/ACT Inhalation Aero Soln Inhale 2 puffs into the lungs every 4 (four) hours as needed. 8 g 0    predniSONE 20 MG Oral Tab Take 2 tablets once a day for 5 days 10 tablet 0    naproxen 500 MG Oral Tab Take 1 tablet (500 mg total) by mouth 2 (two) times daily with meals. 60 tablet 0      History reviewed. No pertinent past medical history.   History reviewed. No pertinent surgical history.   Social History:  Social History     Socioeconomic History    Marital status: Unknown   Tobacco Use    Smoking status: Never    Smokeless tobacco: Never   Vaping Use    Vaping status: Never Used   Substance and Sexual Activity    Alcohol use: Yes     Comment: rarely    Drug use: Never     Social Determinants of Health     Physical Activity: Unknown (4/11/2020)    [facility]    Exercise Vital Sign     Days of Exercise per Week: 0 days      Family History   Problem Relation Age of Onset    Hypertension Mother     Diabetes Mother       No Known Allergies     REVIEW OF SYSTEMS:   Review of Systems   Review of Systems   Constitutional: Negative for activity change, appetite change and fever.   HENT: Negative for congestion and voice change.    Respiratory: Negative for cough and shortness of breath.    Cardiovascular: Negative for chest pain.    Gastrointestinal: Negative for abdominal distention, abdominal pain and vomiting.   Genitourinary: Negative for hematuria.   Skin: Negative for wound.   Psychiatric/Behavioral: Negative for behavioral problems.   Wt Readings from Last 5 Encounters:   04/17/24 256 lb 9.6 oz (116.4 kg)   04/11/24 255 lb (115.7 kg)   03/20/24 261 lb (118.4 kg)   02/22/24 261 lb (118.4 kg)     Body mass index is 36.3 kg/m².      EXAM:   /85 (BP Location: Right arm, Patient Position: Sitting, Cuff Size: large)   Pulse 72   Resp 20   Ht 5' 10.5\" (1.791 m)   Wt 256 lb 9.6 oz (116.4 kg)   LMP 04/02/2024 (Approximate)   SpO2 97%   BMI 36.30 kg/m²   Physical Exam   Constitutional:       Appearance: Normal appearance.   HENT:      Head: Normocephalic.   Eyes:      Conjunctiva/sclera: Conjunctivae normal.   Cardiovascular:      Rate and Rhythm: Normal rate and regular rhythm.      Heart sounds: Normal heart sounds. No murmur heard.  Pulmonary:      Effort: Pulmonary effort is normal.      Breath sounds: Normal breath sounds. No rhonchi or rales.   Abdominal:      General: Bowel sounds are normal.      Palpations: Abdomen is soft.      Tenderness: There is no abdominal tenderness.   Musculoskeletal:      Cervical back: Neck supple.      Right lower leg: No edema.      Left lower leg: No edema.   Skin:     General: Skin is warm and dry.   Neurological:      General: No focal deficit present.      Mental Status: He is alert and oriented to person, place, and time. Mental status is at baseline.   Psychiatric:         Mood and Affect: Mood normal.         Behavior: Behavior normal.       ASSESSMENT AND PLAN:   1. Annual physical exam  - general diet and exercise counseling  - check annual labs  - immunizations reviewed. Pcv20 given   - patient will return for pap smear  - Comp Metabolic Panel (14); Future  - Lipid Panel; Future  - TSH W Reflex To Free T4; Future  - Urinalysis with Culture Reflex; Future  - CBC With Differential With  Platelet; Future    2. Moderate persistent asthma with acute exacerbation (HCC)  - continue albuterol as needed  - fluticasone-salmeterol 250-50 MCG/ACT Inhalation Aerosol Powder, Breath Activated; Inhale 1 puff into the lungs 2 (two) times daily.  Dispense: 1 each; Refill: 3    3. Screening for diabetes mellitus  - family history of DM II  - Hemoglobin A1C [E]; Future      The patient indicates understanding of these issues and agrees to the plan.  Follow up in 1 year for physical     Lakia Benitez MD

## 2024-04-24 ENCOUNTER — OFFICE VISIT (OUTPATIENT)
Dept: PHYSICAL THERAPY | Age: 39
End: 2024-04-24
Attending: PODIATRIST
Payer: COMMERCIAL

## 2024-04-24 DIAGNOSIS — M72.2 PLANTAR FASCIITIS OF RIGHT FOOT: Primary | ICD-10-CM

## 2024-04-24 DIAGNOSIS — M79.671 RIGHT FOOT PAIN: ICD-10-CM

## 2024-04-24 DIAGNOSIS — M21.6X1 EQUINUS DEFORMITY OF RIGHT FOOT: ICD-10-CM

## 2024-04-24 PROCEDURE — 97140 MANUAL THERAPY 1/> REGIONS: CPT | Performed by: PHYSICAL THERAPIST

## 2024-04-24 PROCEDURE — 97110 THERAPEUTIC EXERCISES: CPT | Performed by: PHYSICAL THERAPIST

## 2024-04-24 PROCEDURE — 97112 NEUROMUSCULAR REEDUCATION: CPT | Performed by: PHYSICAL THERAPIST

## 2024-04-24 NOTE — PROGRESS NOTES
Diagnosis:   Plantar fasciitis of right foot (M72.2)  Right foot pain (M79.671)  Equinus deformity of right foot (M21.6X1)         Referring Provider: Tulio Huizar DPM Date of Evaluation:    4/17/2024    Precautions:   None    Per MD: Please treat right plantar fascitis and equinus with stretching and US therapy  Next MD visit:   none scheduled  Date of Surgery: n/a   Insurance Primary/Secondary: [employer] / N/A     # Auth Visits: 8            Subjective: Pt reports \"my foot pain is worse today because I just finished my 11 hour shift.\"    Pain: 7/10      Objective: See table below.  Manual Therapy: x5'  STW: R Achilles Tendon, Plantar Fascia, Gastrocs/Soleus  Grade I-II Talocrural Distraction       Assessment: Pt responded well to therapeutic interventions with some discomfort with ankle DF. Started with balance exercises to help with WB on RLE. Added hip strengthening to help with decreased pressure on B lower extremities.      Goals:   (to be met in 8 visits)  Pt will demonstrate improved DF AROM to >= 10 degrees to promote proper foot clearance during gait and greater ease descending stairs without compensation  Pt will have increased ankle strength to 5/5 throughout for improved ankle control with ADLs such as prolonged gait and stair negotiation (  Pt will have improved SLS to >30s for increased ankle stability with ambulation on uneven surfaces such as gravel and grass   Pt will report <2/10 pain with work and home activities such as standing for 8-10 hours/day for work   Pt will be independent and compliant with comprehensive HEP to maintain progress achieved in PT    Plan: Progress skilled PT as tolerated.  Date: 4/24/2024  TX#: 2/8 Date:                 TX#: 3/ Date:                 TX#: 4/ Date:                 TX#: 5/ Date:   Tx#: 6/ 4-way ankle: 4x10 wth YTB       Gastroc Stretch: 3x30\" in long sitting       HR: 2x10 standing       Wobbleboard: 2x30 A/P and M/L       Tandem Walking: 5x75 ft  balance beam       Lateral Walking: 2x100 ft with RTB       Monster Walking: 2x100 ft RTB       HEP:   Exercises  - Long Sitting Ankle Inversion with Resistance  - 1 x daily - 7 x weekly - 3 sets - 10 reps  - Long Sitting Ankle Eversion with Resistance  - 1 x daily - 7 x weekly - 3 sets - 10 reps  - Long Sitting Ankle Plantar Flexion with Resistance  - 1 x daily - 7 x weekly - 3 sets - 10 reps  - Seated Calf Towel Stretch  - 1 x daily - 7 x weekly - 3 sets - 30 second hold  - Bridge with Hip Abduction and Resistance - Ground Touches  - 1 x daily - 7 x weekly - 3 sets - 10 reps  - Towel Scrunches  - 1 x daily - 7 x weekly - 3 sets - 10 reps    Charges: Therex: 0 minutes  Theract: 15 minutes  NMR: 10 minutes  Manual Therapy: 10 minutes  Total Timed Treatment: 35 min  Total Treatment Time: 35 min

## 2024-04-25 ENCOUNTER — LAB ENCOUNTER (OUTPATIENT)
Dept: LAB | Age: 39
End: 2024-04-25
Attending: INTERNAL MEDICINE
Payer: COMMERCIAL

## 2024-04-25 DIAGNOSIS — Z13.1 SCREENING FOR DIABETES MELLITUS: ICD-10-CM

## 2024-04-25 DIAGNOSIS — Z00.00 ANNUAL PHYSICAL EXAM: ICD-10-CM

## 2024-04-25 LAB
ALBUMIN SERPL-MCNC: 4.3 G/DL (ref 3.2–4.8)
ALBUMIN/GLOB SERPL: 1.4 {RATIO} (ref 1–2)
ALP LIVER SERPL-CCNC: 55 U/L
ALT SERPL-CCNC: 28 U/L
ANION GAP SERPL CALC-SCNC: 4 MMOL/L (ref 0–18)
AST SERPL-CCNC: 19 U/L (ref ?–34)
BASOPHILS # BLD AUTO: 0.04 X10(3) UL (ref 0–0.2)
BASOPHILS NFR BLD AUTO: 0.6 %
BILIRUB SERPL-MCNC: 0.4 MG/DL (ref 0.3–1.2)
BILIRUB UR QL: NEGATIVE
BUN BLD-MCNC: 10 MG/DL (ref 9–23)
BUN/CREAT SERPL: 13.7 (ref 10–20)
CALCIUM BLD-MCNC: 9.3 MG/DL (ref 8.7–10.4)
CHLORIDE SERPL-SCNC: 109 MMOL/L (ref 98–112)
CHOLEST SERPL-MCNC: 165 MG/DL (ref ?–200)
CO2 SERPL-SCNC: 27 MMOL/L (ref 21–32)
COLOR UR: YELLOW
CREAT BLD-MCNC: 0.73 MG/DL
DEPRECATED RDW RBC AUTO: 44.2 FL (ref 35.1–46.3)
EGFRCR SERPLBLD CKD-EPI 2021: 108 ML/MIN/1.73M2 (ref 60–?)
EOSINOPHIL # BLD AUTO: 0.23 X10(3) UL (ref 0–0.7)
EOSINOPHIL NFR BLD AUTO: 3.6 %
ERYTHROCYTE [DISTWIDTH] IN BLOOD BY AUTOMATED COUNT: 13.9 % (ref 11–15)
EST. AVERAGE GLUCOSE BLD GHB EST-MCNC: 120 MG/DL (ref 68–126)
FASTING PATIENT LIPID ANSWER: YES
FASTING STATUS PATIENT QL REPORTED: YES
GLOBULIN PLAS-MCNC: 3 G/DL (ref 2.8–4.4)
GLUCOSE BLD-MCNC: 96 MG/DL (ref 70–99)
GLUCOSE UR-MCNC: NORMAL MG/DL
HBA1C MFR BLD: 5.8 % (ref ?–5.7)
HCT VFR BLD AUTO: 42.7 %
HDLC SERPL-MCNC: 45 MG/DL (ref 40–59)
HGB BLD-MCNC: 13.6 G/DL
IMM GRANULOCYTES # BLD AUTO: 0.03 X10(3) UL (ref 0–1)
IMM GRANULOCYTES NFR BLD: 0.5 %
KETONES UR-MCNC: NEGATIVE MG/DL
LDLC SERPL CALC-MCNC: 102 MG/DL (ref ?–100)
LEUKOCYTE ESTERASE UR QL STRIP.AUTO: 500
LYMPHOCYTES # BLD AUTO: 1.49 X10(3) UL (ref 1–4)
LYMPHOCYTES NFR BLD AUTO: 23.5 %
MCH RBC QN AUTO: 27.6 PG (ref 26–34)
MCHC RBC AUTO-ENTMCNC: 31.9 G/DL (ref 31–37)
MCV RBC AUTO: 86.6 FL
MONOCYTES # BLD AUTO: 0.67 X10(3) UL (ref 0.1–1)
MONOCYTES NFR BLD AUTO: 10.6 %
NEUTROPHILS # BLD AUTO: 3.87 X10 (3) UL (ref 1.5–7.7)
NEUTROPHILS # BLD AUTO: 3.87 X10(3) UL (ref 1.5–7.7)
NEUTROPHILS NFR BLD AUTO: 61.2 %
NITRITE UR QL STRIP.AUTO: NEGATIVE
NONHDLC SERPL-MCNC: 120 MG/DL (ref ?–130)
OSMOLALITY SERPL CALC.SUM OF ELEC: 289 MOSM/KG (ref 275–295)
PH UR: 5 [PH] (ref 5–8)
PLATELET # BLD AUTO: 196 10(3)UL (ref 150–450)
POTASSIUM SERPL-SCNC: 4.8 MMOL/L (ref 3.5–5.1)
PROT SERPL-MCNC: 7.3 G/DL (ref 5.7–8.2)
RBC # BLD AUTO: 4.93 X10(6)UL
SODIUM SERPL-SCNC: 140 MMOL/L (ref 136–145)
SP GR UR STRIP: 1.02 (ref 1–1.03)
TRIGL SERPL-MCNC: 95 MG/DL (ref 30–149)
TSI SER-ACNC: 1.63 MIU/ML (ref 0.55–4.78)
UROBILINOGEN UR STRIP-ACNC: NORMAL
VLDLC SERPL CALC-MCNC: 16 MG/DL (ref 0–30)
WBC # BLD AUTO: 6.3 X10(3) UL (ref 4–11)
WBC #/AREA URNS AUTO: >50 /HPF
WBC CLUMPS UR QL AUTO: PRESENT /HPF

## 2024-04-25 PROCEDURE — 36415 COLL VENOUS BLD VENIPUNCTURE: CPT

## 2024-04-25 PROCEDURE — 83036 HEMOGLOBIN GLYCOSYLATED A1C: CPT

## 2024-04-25 PROCEDURE — 80053 COMPREHEN METABOLIC PANEL: CPT

## 2024-04-25 PROCEDURE — 80061 LIPID PANEL: CPT

## 2024-04-25 PROCEDURE — 85025 COMPLETE CBC W/AUTO DIFF WBC: CPT

## 2024-04-25 PROCEDURE — 84443 ASSAY THYROID STIM HORMONE: CPT

## 2024-04-25 PROCEDURE — 87086 URINE CULTURE/COLONY COUNT: CPT

## 2024-04-25 PROCEDURE — 81001 URINALYSIS AUTO W/SCOPE: CPT

## 2024-04-29 ENCOUNTER — APPOINTMENT (OUTPATIENT)
Dept: PHYSICAL THERAPY | Age: 39
End: 2024-04-29
Attending: PODIATRIST
Payer: COMMERCIAL

## 2024-04-29 NOTE — PROGRESS NOTES
Diagnosis:   Plantar fasciitis of right foot (M72.2)  Right foot pain (M79.671)  Equinus deformity of right foot (M21.6X1)         Referring Provider: Tulio Huizar DPM Date of Evaluation:    4/17/2024    Precautions:   None    Per MD: Please treat right plantar fascitis and equinus with stretching and US therapy  Next MD visit:   none scheduled  Date of Surgery: n/a   Insurance Primary/Secondary: [employer] P[place] / N/A     # Auth Visits: 8            Subjective: Pt reports \"my foot pain is worse today because I just finished my 11 hour shift.\"    Pain: 7/10      Objective: See table below.  Manual Therapy: x5'  STW: R Achilles Tendon, Plantar Fascia, Gastrocs/Soleus  Grade I-II Talocrural Distraction       Assessment: Pt responded well to therapeutic interventions with some discomfort with ankle DF. Started with balance exercises to help with WB on RLE. Added hip strengthening to help with decreased pressure on B lower extremities.      Goals:   (to be met in 8 visits)  Pt will demonstrate improved DF AROM to >= 10 degrees to promote proper foot clearance during gait and greater ease descending stairs without compensation  Pt will have increased ankle strength to 5/5 throughout for improved ankle control with ADLs such as prolonged gait and stair negotiation (  Pt will have improved SLS to >30s for increased ankle stability with ambulation on uneven surfaces such as gravel and grass   Pt will report <2/10 pain with work and home activities such as standing for 8-10 hours/day for work   Pt will be independent and compliant with comprehensive HEP to maintain progress achieved in PT    Plan: Progress skilled PT as tolerated.  Date: 4/24/2024  TX#: 2/8 Date: 4/29/2024                TX#: 3/8 Date:                 TX#: 4/ Date:                 TX#: 5/ Date:   Tx#: 6/ 4-way ankle: 4x10 wth YTB       Gastroc Stretch: 3x30\" in long sitting       HR: 2x10 standing       Wobbleboard: 2x30 A/P and M/L       Tandem Walking:  5x75 ft balance beam       Lateral Walking: 2x100 ft with RTB       Monster Walking: 2x100 ft RTB       HEP:   Exercises  - Long Sitting Ankle Inversion with Resistance  - 1 x daily - 7 x weekly - 3 sets - 10 reps  - Long Sitting Ankle Eversion with Resistance  - 1 x daily - 7 x weekly - 3 sets - 10 reps  - Long Sitting Ankle Plantar Flexion with Resistance  - 1 x daily - 7 x weekly - 3 sets - 10 reps  - Seated Calf Towel Stretch  - 1 x daily - 7 x weekly - 3 sets - 30 second hold  - Bridge with Hip Abduction and Resistance - Ground Touches  - 1 x daily - 7 x weekly - 3 sets - 10 reps  - Towel Scrunches  - 1 x daily - 7 x weekly - 3 sets - 10 reps    Charges: Therex: 0 minutes  Theract: 15 minutes  NMR: 10 minutes  Manual Therapy: 10 minutes  Total Timed Treatment: 35 min  Total Treatment Time: 35 min

## 2024-04-30 ENCOUNTER — PATIENT MESSAGE (OUTPATIENT)
Dept: INTERNAL MEDICINE CLINIC | Facility: CLINIC | Age: 39
End: 2024-04-30

## 2024-04-30 DIAGNOSIS — J45.41 MODERATE PERSISTENT ASTHMA WITH ACUTE EXACERBATION (HCC): ICD-10-CM

## 2024-04-30 NOTE — TELEPHONE ENCOUNTER
Pended and routed to refill pool to run protocol      Last refill: 4/11/24    PER DR. DE LUNA'S 4/17/24 OFFICE NOTE:    2. Moderate persistent asthma with acute exacerbation (HCC)  - continue albuterol as needed  - fluticasone-salmeterol 250-50 MCG/ACT Inhalation Aerosol Powder, Breath Activated; Inhale 1 puff into the lungs 2 (two) times daily.  Dispense: 1 each; Refill: 3

## 2024-04-30 NOTE — TELEPHONE ENCOUNTER
From: Salome Salamanca  To: Lakia Benitez  Sent: 4/30/2024 12:16 PM CDT  Subject: Inhaler     Hello,     I need to get a new inhaler soon, I have about 15 puff and I’m not sure if the previous doctor gave me the inhaler with refills. Would u be able to refill the prescription?

## 2024-05-01 ENCOUNTER — OFFICE VISIT (OUTPATIENT)
Dept: PHYSICAL THERAPY | Age: 39
End: 2024-05-01
Attending: PODIATRIST
Payer: COMMERCIAL

## 2024-05-01 DIAGNOSIS — M21.6X1 EQUINUS DEFORMITY OF RIGHT FOOT: ICD-10-CM

## 2024-05-01 DIAGNOSIS — M72.2 PLANTAR FASCIITIS OF RIGHT FOOT: Primary | ICD-10-CM

## 2024-05-01 DIAGNOSIS — M79.671 RIGHT FOOT PAIN: ICD-10-CM

## 2024-05-01 PROCEDURE — 97110 THERAPEUTIC EXERCISES: CPT | Performed by: PHYSICAL THERAPIST

## 2024-05-01 PROCEDURE — 97140 MANUAL THERAPY 1/> REGIONS: CPT | Performed by: PHYSICAL THERAPIST

## 2024-05-01 PROCEDURE — 97112 NEUROMUSCULAR REEDUCATION: CPT | Performed by: PHYSICAL THERAPIST

## 2024-05-01 PROCEDURE — 97530 THERAPEUTIC ACTIVITIES: CPT | Performed by: PHYSICAL THERAPIST

## 2024-05-01 RX ORDER — ALBUTEROL SULFATE 90 UG/1
2 AEROSOL, METERED RESPIRATORY (INHALATION) EVERY 4 HOURS PRN
Qty: 25.5 G | Refills: 3 | Status: SHIPPED | OUTPATIENT
Start: 2024-05-01

## 2024-05-01 NOTE — PROGRESS NOTES
Diagnosis:   Plantar fasciitis of right foot (M72.2)  Right foot pain (M79.671)  Equinus deformity of right foot (M21.6X1)         Referring Provider: Tulio Huizar DPM Date of Evaluation:    4/17/2024    Precautions:   None    Per MD: Please treat right plantar fascitis and equinus with stretching and US therapy  Next MD visit:   none scheduled  Date of Surgery: n/a   Insurance Primary/Secondary: [employer] / N/A     # Auth Visits: 8            Subjective: Pt reports \"my foot is better than what it was before; however, it gets worse after my shift when I am on my feet all day, I get my orthotics next month.\"    Pain: 4/10      Objective: See table below.         Assessment: Pt responded well to therapeutic interventions with some discomfort/TTP to R plantar fascia. Added WB to help with ankle stability and ankle ROM. Resumed with hip strengthening to help with decreased WB and pressure to the R foot.      Goals:   (to be met in 8 visits)  Pt will demonstrate improved DF AROM to >= 10 degrees to promote proper foot clearance during gait and greater ease descending stairs without compensation  Pt will have increased ankle strength to 5/5 throughout for improved ankle control with ADLs such as prolonged gait and stair negotiation (  Pt will have improved SLS to >30s for increased ankle stability with ambulation on uneven surfaces such as gravel and grass   Pt will report <2/10 pain with work and home activities such as standing for 8-10 hours/day for work   Pt will be independent and compliant with comprehensive HEP to maintain progress achieved in PT    Plan: Progress skilled PT as tolerated.  Date: 4/24/2024  TX#: 2/8 Date: 5/1/2024                TX#: 3/8 Date:                 TX#: 4/ Date:                 TX#: 5/ Date:   Tx#: 6/ 4-way ankle: 4x10 wth YTB Therex: x10'  Bike: x5'  HS Stretch: 3x30\" DKSA R        Gastroc Stretch: 3x30\" in long sitting Theract: x10'  Bridges: 2x10 SB  Clams: 2x10 SL RTB R/L       HR: 2x10 standing NMR: x10'  Tandem Walking: 5x75 ft balance beam  Tandem Balance: 4x30\" R/L balance beam  Wobbleboard: 2x1' M/L and A/P; 2x30 M/L A/P taps      Wobbleboard: 2x30 A/P and M/L Manual Therapy: x10'    STW: R Achilles Tendon, Plantar Fascia, Gastrocs/Soleus  Grade I-II Talocrural Distraction      Tandem Walking: 5x75 ft balance beam       Lateral Walking: 2x100 ft with RTB       Monster Walking: 2x100 ft RTB       HEP:   Exercises  - Long Sitting Ankle Inversion with Resistance  - 1 x daily - 7 x weekly - 3 sets - 10 reps  - Long Sitting Ankle Eversion with Resistance  - 1 x daily - 7 x weekly - 3 sets - 10 reps  - Long Sitting Ankle Plantar Flexion with Resistance  - 1 x daily - 7 x weekly - 3 sets - 10 reps  - Seated Calf Towel Stretch  - 1 x daily - 7 x weekly - 3 sets - 30 second hold  - Bridge with Hip Abduction and Resistance - Ground Touches  - 1 x daily - 7 x weekly - 3 sets - 10 reps  - Towel Scrunches  - 1 x daily - 7 x weekly - 3 sets - 10 reps    Charges: Therex: 10 minutes  Theract: 10 minutes  NMR: 10 minutes  Manual Therapy: 10 minutes  Total Timed Treatment: 40 min  Total Treatment Time: 40 min

## 2024-05-01 NOTE — TELEPHONE ENCOUNTER
Please review. Rx failed/no protocol.    Requested Prescriptions   Pending Prescriptions Disp Refills    albuterol 108 (90 Base) MCG/ACT Inhalation Aero Soln 8 g 0     Sig: Inhale 2 puffs into the lungs every 4 (four) hours as needed.       Asthma & COPD Medication Protocol Failed - 4/30/2024  4:50 PM        Failed - Asthma Action Score greater than or equal to 20        Failed - AAP/ACT given in last 12 months     No data recorded  No data recorded  No data recorded  No data recorded          Passed - Appointment in past 6 or next 3 months      Recent Outpatient Visits              Today Plantar fasciitis of right foot    [facility] in [place] CodyDyan frost, PT    Office Visit    1 week ago Plantar fasciitis of right foot    [facility] in [place] KarpuzDyan santana, PT    Office Visit    2 weeks ago Plantar fasciitis of right foot    [facility] in [place] KarpuzDyan santana, PT    Office Visit    2 weeks ago Annual physical exam    [facility], [address], [place] Lakia Benitez MD    Office Visit    2 weeks ago Moderate persistent asthma with acute exacerbation (HCC)    [facility] Kami Petersen APRN    Office Visit          Future Appointments         Provider Department Appt Notes    In 2 days Dyan Perez PT [facility] in [place]     In 1 week Dyan Perez, PT [facility] in [place] [employer]   $35 c/p, 50 visits, no auth    In 2 weeks Dyan Perez, PT [facility] in [place]     In 2 weeks Dyan Perez, PT [facility] in [place]     In 1 month Najjar, Samer F, MD [facility] Varicose vein follow up  (Policy informed)    In 4 months Humberto Masters MD [facility] np ee                         Future Appointments         Provider Department Appt Notes    In 2 days Dyan Perez  PT [facility] in [place]     In 1 week Dyan Perez, PT [facility] in [place] [employer]   $35 c/p, 50 visits, no auth    In 2 weeks Dyan Perez, PT [facility] in [place]     In 2 weeks Dyan Perez, PT [facility] in [place]     In 1 month Najjar, Samer F, MD [facility], [place] Varicose vein follow up  (Policy informed)    In 4 months uHmberto Masters MD [facility], [place] np ee          Recent Outpatient Visits              Today Plantar fasciitis of right foot    [facility] in [place] Dyan Perez, PT    Office Visit    1 week ago Plantar fasciitis of right foot    [facility] in [place] Dyan Perez, PT    Office Visit    2 weeks ago Plantar fasciitis of right foot    [facility] in [place] CodyDyan frost, PT    Office Visit    2 weeks ago Annual physical exam    [facility], [address], [place] Lakia Benitez MD    Office Visit    2 weeks ago Moderate persistent asthma with acute exacerbation (HCC)    [facility] Kami Petersen APRN    Office Visit

## 2024-05-03 ENCOUNTER — APPOINTMENT (OUTPATIENT)
Dept: PHYSICAL THERAPY | Age: 39
End: 2024-05-03
Attending: PODIATRIST
Payer: COMMERCIAL

## 2024-05-08 ENCOUNTER — APPOINTMENT (OUTPATIENT)
Dept: PHYSICAL THERAPY | Age: 39
End: 2024-05-08
Attending: PODIATRIST
Payer: COMMERCIAL

## 2024-05-08 ENCOUNTER — TELEPHONE (OUTPATIENT)
Dept: PHYSICAL THERAPY | Facility: HOSPITAL | Age: 39
End: 2024-05-08

## 2024-05-15 ENCOUNTER — APPOINTMENT (OUTPATIENT)
Dept: PHYSICAL THERAPY | Age: 39
End: 2024-05-15
Attending: PODIATRIST
Payer: COMMERCIAL

## 2024-05-17 ENCOUNTER — APPOINTMENT (OUTPATIENT)
Dept: PHYSICAL THERAPY | Age: 39
End: 2024-05-17
Attending: PODIATRIST
Payer: COMMERCIAL

## 2024-06-04 ENCOUNTER — APPOINTMENT (OUTPATIENT)
Dept: PHYSICAL THERAPY | Age: 39
End: 2024-06-04
Attending: PODIATRIST
Payer: COMMERCIAL

## 2024-06-05 DIAGNOSIS — J45.41 MODERATE PERSISTENT ASTHMA WITH ACUTE EXACERBATION (HCC): ICD-10-CM

## 2024-06-10 RX ORDER — FLUTICASONE PROPIONATE AND SALMETEROL 250; 50 UG/1; UG/1
1 POWDER RESPIRATORY (INHALATION) 2 TIMES DAILY
Qty: 1 EACH | Refills: 3 | OUTPATIENT
Start: 2024-06-10

## 2024-06-11 ENCOUNTER — HOSPITAL ENCOUNTER (OUTPATIENT)
Dept: GENERAL RADIOLOGY | Age: 39
Discharge: HOME | End: 2024-06-11
Attending: PODIATRIST
Payer: COMMERCIAL

## 2024-06-11 ENCOUNTER — OFFICE VISIT (OUTPATIENT)
Dept: PODIATRY CLINIC | Facility: CLINIC | Age: 39
End: 2024-06-11
Payer: COMMERCIAL

## 2024-06-11 DIAGNOSIS — M79.672 LEFT FOOT PAIN: ICD-10-CM

## 2024-06-11 DIAGNOSIS — M77.42 METATARSALGIA, LEFT FOOT: ICD-10-CM

## 2024-06-11 DIAGNOSIS — M77.42 METATARSALGIA, LEFT FOOT: Primary | ICD-10-CM

## 2024-06-11 PROCEDURE — 99213 OFFICE O/P EST LOW 20 MIN: CPT | Performed by: PODIATRIST

## 2024-06-11 PROCEDURE — 73630 X-RAY EXAM OF FOOT: CPT | Performed by: PODIATRIST

## 2024-06-11 NOTE — PROGRESS NOTES
[facility] Podiatry  Progress Note    Salome Salamanca is a 39 year old female.   Chief Complaint   Patient presents with    Foot Pain     Bilateral - onset about 1 mo ago - has pain in the ball of her feet and in the toes rated as 3-9/10 on and off - no in jury          HPI:     This is a pleasant female that presents to clinic today due to right plantar fascitis pain f/u.  She notes great improvement and did complete PT.      She now complains of left ball of foot pain.  She denies any injury.        Allergies: Patient has no known allergies.   Current Outpatient Medications   Medication Sig Dispense Refill    albuterol 108 (90 Base) MCG/ACT Inhalation Aero Soln Inhale 2 puffs into the lungs every 4 (four) hours as needed. 25.5 g 3    fluticasone-salmeterol 250-50 MCG/ACT Inhalation Aerosol Powder, Breath Activated Inhale 1 puff into the lungs 2 (two) times daily. 1 each 3    predniSONE 20 MG Oral Tab Take 2 tablets once a day for 5 days 10 tablet 0    naproxen 500 MG Oral Tab Take 1 tablet (500 mg total) by mouth 2 (two) times daily with meals. 60 tablet 0      History reviewed. No pertinent past medical history.   History reviewed. No pertinent surgical history.   Family History   Problem Relation Age of Onset    Hypertension Mother     Diabetes Mother       Social History     Socioeconomic History    Marital status: Unknown   Tobacco Use    Smoking status: Never    Smokeless tobacco: Never   Vaping Use    Vaping status: Never Used   Substance and Sexual Activity    Alcohol use: Yes     Comment: rarely    Drug use: Never           REVIEW OF SYSTEMS:   Denies nausea, fever, chills  No calf pain  No other muscle or joint aches  Denies chest pain or shortness of breath.      EXAM:   [facility] 04/02/2024 (Approximate)     Constitution: Well-developed and well-nourished. Gait appears normal. No apparent distress. Alert and oriented to person, place, and time.  Integument: There are no varicosities. Skin appears moist,  warm, and supple with positive hair growth. There are no color changes. No open lesions. No macerations, No Hyperkeratotic lesions.  Vascular examination: Dorsalis pedis and posterior tibial pulses are strong bilaterally with capillary filling time less than 3 seconds to all digits. There is no peripheral edema.  Neurological Sensorium: Grossly intact to sharp/dull. Vibratory: Intact.  Musculoskeletal:   5/5 pedal muscle strength b/l.    Pain on compression to right plantar calcaneal tubercle at Plantar fascial insertion site, improved    B/L HAV deformity with HDS 2-5 b/l    POP diffusely to left ball of foot      LABS & IMAGING:     Lab Results   Component Value Date    GLU 96 04/25/2024    BUN 10 04/25/2024    CREATSERUM 0.73 04/25/2024    BUNCREA 13.7 04/25/2024    ANIONGAP 4 04/25/2024    CA 9.3 04/25/2024     04/25/2024    K 4.8 04/25/2024     04/25/2024    CO2 27.0 04/25/2024    OSMOCALC 289 04/25/2024        Lab Results   Component Value Date     04/25/2024    A1C 5.8 (H) 04/25/2024        No results found.     ASSESSMENT AND PLAN:   Diagnoses and all orders for this visit:    Metatarsalgia, left foot    Left foot pain          Plan:     Discussed the etiology of this condition as well as both conservative and surgical treatment options.  Discussed conservative measures to include wide shoes, extra depth shoes, padding, offloading, orthotics, anti-inflammatory medication, and/or steroid injections.        Discussed with pt that her left foot pain is most likely due to metatarsalgia from her bunion and hammertoe deformities.      Prescribed custom orthotics, she does have an appt on June 18th    Ordered left foot full WB xrays    Pt will f/u with Dr. WALDEN in approx 2 months.      No follow-ups on file.    Tulio Huizar DPM  6/11/24

## 2024-07-09 ENCOUNTER — OFFICE VISIT (OUTPATIENT)
Dept: SLEEP CENTER | Age: 39
End: 2024-07-09
Attending: INTERNAL MEDICINE
Payer: COMMERCIAL

## 2024-07-09 DIAGNOSIS — G47.33 OSA (OBSTRUCTIVE SLEEP APNEA): ICD-10-CM

## 2024-07-09 PROCEDURE — 95806 SLEEP STUDY UNATT&RESP EFFT: CPT

## 2024-07-10 ENCOUNTER — TELEPHONE (OUTPATIENT)
Dept: INTERNAL MEDICINE CLINIC | Facility: CLINIC | Age: 39
End: 2024-07-10

## 2024-07-10 DIAGNOSIS — G47.33 OSA (OBSTRUCTIVE SLEEP APNEA): Primary | ICD-10-CM

## 2024-07-10 NOTE — PROCEDURES
[facility]  Accredited by the American Academy of Sleep Medicine (AASM)    PATIENT'S NAME: HERNANDEZ FELDER   ATTENDING PHYSICIAN: Lakia Benitez MD   REFERRING PHYSICIAN: Lakia Benitez MD   PATIENT ACCOUNT #: [id number] LOCATION: Sleep Center   MEDICAL RECORD #: [id number] YOB: 1985   DATE OF STUDY: 07/09/2024       SLEEP STUDY REPORT    STUDY TYPE:  Home sleep test.    INDICATION:  Suspected obstructive sleep apnea (ICD-10 code G47.33) in patient with snoring, witnessed apneic events, unrefreshing sleep, excessive daytime somnolence, nocturnal awakenings, heartburn, obesity with a body mass index 35.7, and an [place] Sleepiness Scale score of 23.    RESULTS:  The patient underwent home sleep test with measurement of her nasal airflow, nasal air pressure, snoring, chest and abdominal wall motion, oximetry, and body position.  I have reviewed the entirety of the raw data of this study.  During this study, the total recording time is 549 minutes.  The lights-out clock time is 12:18 a.m., the lights-on clock time is 9:28 a.m.  The apnea plus hypopnea index is 34.7 events per hour.  The supine apnea plus hypopnea index is 79.6 events per hour.  The average oxygen saturation is 91%, the lowest oxygen saturation is 82%, and the patient spent 16.6% of the test with saturations 88% or less.  The average heart rate is 65 beats per minute, and the patient spent approximately 20% of the test in the supine position.     INTERPRETATION:  The data generated from this study is consistent with severe obstructive sleep apnea which is much worse in the supine position (ICD-10 code G47.33).    RECOMMENDATIONS:    1.   CPAP titration.  2.   Weight loss.  3.   Avoid alcohol.  4.   Avoid sedating drug.  5.   Patient should not drive if at all sleepy.    Please do not hesitate to contact me if there is any question whatsoever regarding interpretation of this study.    Dictated By Yousif Valencia,  MD  d: 07/10/2024 17:48:23  t: 07/10/2024 17:54:20  [facility] [id number]  [facility]/    cc: Lakia Benitez MD

## 2024-07-11 ENCOUNTER — PATIENT MESSAGE (OUTPATIENT)
Dept: INTERNAL MEDICINE CLINIC | Facility: CLINIC | Age: 39
End: 2024-07-11

## 2024-07-11 NOTE — TELEPHONE ENCOUNTER
Mandi Polanco, RN 7/11/2024 10:03 AM CDT        ----- Message -----  From: Salome Salamanca  Sent: 7/11/2024 7:46 AM CDT  To: Em Rn Triage  Subject: Sleep specialist     Do u have a name or number for a sleep specialist? I do not know which number to call.

## 2024-07-26 ENCOUNTER — ORDER TRANSCRIPTION (OUTPATIENT)
Dept: SLEEP CENTER | Age: 39
End: 2024-07-26

## 2024-07-29 ENCOUNTER — OFFICE VISIT (OUTPATIENT)
Dept: SLEEP CENTER | Age: 39
End: 2024-07-29
Attending: INTERNAL MEDICINE
Payer: COMMERCIAL

## 2024-07-29 DIAGNOSIS — G47.33 OSA (OBSTRUCTIVE SLEEP APNEA): Primary | ICD-10-CM

## 2024-07-29 PROCEDURE — 95811 POLYSOM 6/>YRS CPAP 4/> PARM: CPT

## 2024-08-19 ENCOUNTER — OFFICE VISIT (OUTPATIENT)
Dept: PODIATRY CLINIC | Facility: CLINIC | Age: 39
End: 2024-08-19

## 2024-08-19 DIAGNOSIS — M77.42 METATARSALGIA, LEFT FOOT: Primary | ICD-10-CM

## 2024-08-19 DIAGNOSIS — M20.12 HALLUX VALGUS (ACQUIRED), LEFT FOOT: ICD-10-CM

## 2024-08-19 DIAGNOSIS — M20.42 HAMMER TOE OF LEFT FOOT: ICD-10-CM

## 2024-08-19 DIAGNOSIS — M79.672 LEFT FOOT PAIN: ICD-10-CM

## 2024-08-19 PROCEDURE — 99213 OFFICE O/P EST LOW 20 MIN: CPT | Performed by: STUDENT IN AN ORGANIZED HEALTH CARE EDUCATION/TRAINING PROGRAM

## 2024-08-19 NOTE — PROGRESS NOTES
[facility] Podiatry  Progress Note      Salome Salamanca is a 39 year old female.   Chief Complaint   Patient presents with    Foot Pain     F/u Left foot bunion and hammer toe, pain 8/10 worse after working for 8 hrs walking. Has XR in Epic done on 06/11/24 LOV with Dr Huizar.         HPI:     Patient is a pleasant 39-year-old female presents today for evaluation of left foot pain.  Admits to pain to bunion site and hammered digits 2 through 4.  Patient relates that she is exhausted conservative treatment options and would like to discuss further treatment at this time.  Patient does work prolonged hours standing and her job.  She has been using supportive shoes with custom orthotics with no relief in her pain      Allergies: Patient has no known allergies.    Current Outpatient Medications   Medication Sig Dispense Refill    albuterol 108 (90 Base) MCG/ACT Inhalation Aero Soln Inhale 2 puffs into the lungs every 4 (four) hours as needed. 25.5 g 3    fluticasone-salmeterol 250-50 MCG/ACT Inhalation Aerosol Powder, Breath Activated Inhale 1 puff into the lungs 2 (two) times daily. 1 each 3    predniSONE 20 MG Oral Tab Take 2 tablets once a day for 5 days (Patient not taking: Reported on 8/19/2024) 10 tablet 0    naproxen 500 MG Oral Tab Take 1 tablet (500 mg total) by mouth 2 (two) times daily with meals. (Patient not taking: Reported on 8/19/2024) 60 tablet 0      History reviewed. No pertinent past medical history.   History reviewed. No pertinent surgical history.   Family History   Problem Relation Age of Onset    Hypertension Mother     Diabetes Mother       Social History     Socioeconomic History    Marital status: Unknown   Tobacco Use    Smoking status: Never    Smokeless tobacco: Never   Vaping Use    Vaping status: Never Used   Substance and Sexual Activity    Alcohol use: Yes     Comment: rarely    Drug use: Never           REVIEW OF SYSTEMS:     Denies nause, fever, chills  No calf pain  Denies  chest pain or SOB      EXAM:   LMP 04/02/2024 (Approximate)   GENERAL: well developed, well nourished, in no apparent distress  EXTREMITIES:   1. Integument: Normal skin temperature and turgor  2. Vascular: Dorsalis pedis two out of four bilateral and posterior tibial pulses two out of   four bilateral, capillary refill normal.   3. Musculoskeletal: All muscle groups are graded 5 out of 5 in the foot and ankle.  Lateral deviation of left hallux with prominent first metatarsal medial eminence.  Flexible flexion contracture of digits 2 through 4.  Valgus deformity of lesser digits of left foot.   4. Neurological: Normal sharp dull sensation; reflexes normal.             ASSESSMENT AND PLAN:   Diagnoses and all orders for this visit:    Metatarsalgia, left foot    Left foot pain    Hammer toe of left foot    Hallux valgus (acquired), left foot        Plan:     Evaluated the patient and discussed treatment options with the patient.  Discussed the biomechanical causes and implications of hammertoe deformity.  Discussed conservative vs. surgical treatment options.  Discussed surgical options to vary from tenotomy, to arthroplasty without pinning, to arthroplasty with pinning, and fusion depending on overall goals.  Recommended extra depth and extra width shoes.  Recommended padding of the affected areas.  Dispensed no pads this date.  Discussed rigid vs. flexible deformity as well as goals and success rate of varying treatment options.  Pt elects to move forward with conservative treatment at this time.  Patient had forms which she brought in today I informed her to submit the paperwork to the forms department.  Informed patient that I will be going on maternity leave and advised her to follow-up with Dr. Roberta Arcos for possible surgical intervention      The patient indicates understanding of these issues and agrees to the plan.        Diana Cox DPM

## 2024-08-20 ENCOUNTER — TELEPHONE (OUTPATIENT)
Dept: PODIATRY CLINIC | Facility: CLINIC | Age: 39
End: 2024-08-20

## 2024-08-20 NOTE — TELEPHONE ENCOUNTER
Patient called for form submission information.  Patient requesting intermittent Family Medical Leave Act.    Type of Leave: intermittent  Reason for Leave: Metatarsalgia, left foot  Start date of leave: 8/19/24 - 8/19/25  How much time needed?: 1-5 flare ups/mo; each lasting 1-2days and 1-2 appointment/mo; each lasting 1-4 hrs.  Forms Due Date:  Was Fee and Turnaround info Given?: yes

## 2024-08-20 NOTE — TELEPHONE ENCOUNTER
Dr Cox,    Patient is requesting intermittent Family Medical Leave Act due to Metatarsalgia, left foot.    1-5 flare ups/mo; each episode lasting 1-2 days  And   1-2 appointment/mo; each lasting 1-4 hours    Do you support?    Thank you Dr Gonzáles!      Rama

## 2024-08-23 NOTE — TELEPHONE ENCOUNTER
Family Medical Leave Act forms received in forms dept. Logged for processing. [employer] message sent for completion of Release of Information.

## 2024-09-03 NOTE — TELEPHONE ENCOUNTER
Dr. Cox,    Please sign off on form if you agree to:  Intermittent Family Medical Leave Act due to Metatarsalgia, left foot; start 8/19/24 - 8/19/25    -Signature page will be the first page scanned  -From your Inbasket, Highlight the patient and click Chart   -Double click the 8/20/2024 Forms Completion telephone encounter  -Scroll down to the Media section   -Click the blue Hyperlink: REGAN Cox 9/03/24  -Click Acknowledge located in the top right ribbon/menu   -Drag the mouse into the blank space of the document and a + sign will appear. Left click to   electronically sign the document.  -Once signed, simply exit out of the screen and you signature will be saved.     Thank you!      Rama

## 2024-09-27 NOTE — PROGRESS NOTES
Initial Pulmonary Medicine Outpatient Consultation           Reason for Consultation and CC: VEDA     Referring Physician: Dr. Benitez       HPI: I had the pleasure of seeing Salome Salamanca for a Pulmonary Medicine consultation on 9/30/24  Completed PSG c/w VEDA. Subsequent CPAP titration study showed needs to be started on CPAP 9 cmH20.   Not started on therapy yet.   Wakes up a lot, stops breathing at night, tired during the daytime.   Has dozed off while driving.  Has a hx asthma was on Advair but switched to generic d/t insurance coverage. Reports asthma was better controled on Advair but not as well with the generic inhaler. Now using the Albuterol a few times a day.      REVIEW OF SYSTEMS:  Positives and negatives as stated in HPI. Remainder of 12 pt review of systems otherwise are negative.       PAST MEDICAL HISTORY:  VEDA   Asthma       PAST SURGICAL HISTORY:  No past surgical history on file.       PAST FAMILY HISTORY:  Family History   Problem Relation Age of Onset    Hypertension Mother     Diabetes Mother    Maybe sleep apnea in the family  Brother has asthma and sleep apnea       PAST SOCIAL HISTORY:  Social History     Socioeconomic History    Marital status: Unknown   Tobacco Use    Smoking status: Never    Smokeless tobacco: Never   Vaping Use    Vaping status: Never Used   Substance and Sexual Activity    Alcohol use: Yes     Comment: rarely    Drug use: Never     Social Determinants of Health     Physical Activity: Unknown (4/11/2020)    Received [facility]    Exercise Vital Sign     Days of Exercise per Week: 0 days   Quit smoking in 6/2023. Prior to that smoked for 20 yrs @ 1/2 PPD  Lives at home sign other  2 dogs  Works for [employer]      ALLERGIES:  No Known Allergies       MEDS:  Current Outpatient Medications on File Prior to Visit   Medication Sig Dispense Refill    albuterol 108 (90 Base) MCG/ACT Inhalation Aero Soln Inhale 2 puffs into the lungs every 4  (four) hours as needed. 25.5 g 3    fluticasone-salmeterol 250-50 MCG/ACT Inhalation Aerosol Powder, Breath Activated Inhale 1 puff into the lungs 2 (two) times daily. 1 each 3    predniSONE 20 MG Oral Tab Take 2 tablets once a day for 5 days (Patient not taking: Reported on 8/19/2024) 10 tablet 0    naproxen 500 MG Oral Tab Take 1 tablet (500 mg total) by mouth 2 (two) times daily with meals. (Patient not taking: Reported on 8/19/2024) 60 tablet 0     No current facility-administered medications on file prior to visit.        PHYSICAL EXAM:  /79   Pulse 74   Resp 14   Ht 5' 11\" (1.803 m)   Wt 255 lb (115.7 kg)   LMP 04/02/2024 (Approximate)   SpO2 97%   BMI 35.57 kg/m²   CONSTITUTIONAL: alert, oriented, no apparent distress  HEENT: atraumatic normocephalic  MOUTH: mucous membranes are moist. No OP exudates  NECK/THROAT: no JVD. Trachea midline. No obvious thyromegaly  LUNG: clear b/l no wheezing, crackles. Chest symmetric with respiratory motion  HEART: regular rate and rhythm, no obvious murmers or gallops note  ABD: soft non tender. + bowel sounds. No organomegaly noted  EXT: no clubbing, cyanosis, or edema noted. Pulses intact grossly  NEURO/MUSCULOSKELETAL: no gross deficits  SKIN: warm, dry. No obvious lesions noted  LYMPH: no obvious LAD       IMAGES:  No chest imaging to review       LABS:  Lab Results   Component Value Date    WBC 6.3 04/25/2024    RBC 4.93 04/25/2024    HGB 13.6 04/25/2024    HCT 42.7 04/25/2024    MCV 86.6 04/25/2024    MCH 27.6 04/25/2024    MCHC 31.9 04/25/2024     No results for input(s): \"GLU\", \"BUN\", \"CREATSERUM\", \"GFRAA\", \"GFRNAA\", \"EGFRCR\", \"CA\", \"ALB\", \"NA\", \"K\", \"CL\", \"CO2\", \"ALKPHO\", \"AST\", \"ALT\", \"BILT\", \"TP\" in the last 168 hours.       PFTS none to review       ASSESSMENT/PLAN:  1.Sleep apnea  Start using the CPAP machine:  1.  Change hosing/mask/filter regularly                 - clean regularly using distilled water with humidifier                 - contact DME  or CPAP coordinator if need supplies  2.  Absolutely no driving while sleepy  3.  Avoid alcohol and caffeine before bedtime  4.  Weight loss and exercise help improve sleep apnea as well  5.  Good sleep hygiene discussed at length  6.  Education provided    2.Asthma  -Schedule PFTS  -Continue Advair (250/50) 2 puffs BID. Will need prior auth  -Also gave pt list of alternative inhalers to check with pharmacy about coverage  -Continue PRN Albuterol    RTC in 2 months      Thank you for the opportunity to care for Salome Salamanca.    I spent a total of 45 minutes in direct contact with the patient and reviewing pertinent outside records on the day of the encounter.     MAMADOU Perez DO, MPH  Pulmonary and Critical Care Medicine  [facility] Pulmonary and Critical Care Medicine

## 2024-09-30 ENCOUNTER — OFFICE VISIT (OUTPATIENT)
Dept: PULMONOLOGY | Facility: CLINIC | Age: 39
End: 2024-09-30

## 2024-09-30 ENCOUNTER — PATIENT MESSAGE (OUTPATIENT)
Dept: PULMONOLOGY | Facility: CLINIC | Age: 39
End: 2024-09-30

## 2024-09-30 VITALS
BODY MASS INDEX: 35.7 KG/M2 | RESPIRATION RATE: 14 BRPM | SYSTOLIC BLOOD PRESSURE: 125 MMHG | WEIGHT: 255 LBS | HEIGHT: 71 IN | OXYGEN SATURATION: 97 % | HEART RATE: 74 BPM | DIASTOLIC BLOOD PRESSURE: 79 MMHG

## 2024-09-30 DIAGNOSIS — J45.40 MODERATE PERSISTENT ASTHMA, UNSPECIFIED WHETHER COMPLICATED (HCC): ICD-10-CM

## 2024-09-30 DIAGNOSIS — G47.33 OSA (OBSTRUCTIVE SLEEP APNEA): Primary | ICD-10-CM

## 2024-09-30 PROCEDURE — 3008F BODY MASS INDEX DOCD: CPT | Performed by: INTERNAL MEDICINE

## 2024-09-30 PROCEDURE — 99204 OFFICE O/P NEW MOD 45 MIN: CPT | Performed by: INTERNAL MEDICINE

## 2024-09-30 PROCEDURE — 3074F SYST BP LT 130 MM HG: CPT | Performed by: INTERNAL MEDICINE

## 2024-09-30 PROCEDURE — 3078F DIAST BP <80 MM HG: CPT | Performed by: INTERNAL MEDICINE

## 2024-09-30 RX ORDER — FLUTICASONE PROPIONATE AND SALMETEROL 250; 50 UG/1; UG/1
1 POWDER RESPIRATORY (INHALATION) 2 TIMES DAILY
Qty: 1 EACH | Refills: 5 | Status: SHIPPED | OUTPATIENT
Start: 2024-09-30

## 2024-09-30 NOTE — PATIENT INSTRUCTIONS
Start using the CPAP 9 cmH20 machine:  1.  Change hosing/mask/filter regularly                 - clean regularly using distilled water with humidifier                 - contact DME or CPAP coordinator if need supplies  2.  Absolutely no driving while sleepy  3.  Avoid alcohol and caffeine before bedtime  4.  Weight loss and exercise help improve sleep apnea as well  5.  Good sleep hygiene discussed at length  6.  Education provided      -Schedule a Pulmonary Function Test    -We will see if we can do a prior authorization for Advair (250/50). In the meantime, continue with the current generic Fluticasone/Salmeterol (250/50).  -Ask your pharmacist what inhaler that has a steroid and a long acting bronchodilator (similar to Advair) is covered by insurance. Let us know  -Continue the Albuterol inhaler as needed.     Come back in 2 months.

## 2024-09-30 NOTE — TELEPHONE ENCOUNTER
From: Salome Salamanca  To: Bruce Perez  Sent: 9/30/2024 12:16 PM CDT  Subject: Advir    Hello,     I was there today and the doctor told me to go to [facility] and give them a sheet with different inhalers to see what my insurance covers. I want the Advir but for some reason I keep getting Wixela or Breo but those 2 have not worked for me. [facility] told me my doctor can check insurance not them unless I have a prescription.

## 2024-10-16 ENCOUNTER — PATIENT MESSAGE (OUTPATIENT)
Dept: PULMONOLOGY | Facility: CLINIC | Age: 39
End: 2024-10-16

## 2024-10-23 ENCOUNTER — OFFICE VISIT (OUTPATIENT)
Dept: PODIATRY CLINIC | Facility: CLINIC | Age: 39
End: 2024-10-23

## 2024-10-23 DIAGNOSIS — M20.12 HALLUX VALGUS (ACQUIRED), LEFT FOOT: ICD-10-CM

## 2024-10-23 DIAGNOSIS — M79.672 LEFT FOOT PAIN: Primary | ICD-10-CM

## 2024-10-23 DIAGNOSIS — M20.42 HAMMER TOE OF LEFT FOOT: ICD-10-CM

## 2024-10-23 PROCEDURE — 99214 OFFICE O/P EST MOD 30 MIN: CPT | Performed by: PODIATRIST

## 2024-10-23 NOTE — PROGRESS NOTES
Reason for Visit      Salome Salamanca is a 39 year old female presents today complaining of left foot pain.     History of Present Illness     Patient presents to clinic today after being seen by podiatry a few months prior for management of her left bunion and hammertoe deformity.  At that time patient was complaining of pain at her bunion and hammertoes 2 through 4.  Patient exhausted conservative measures of orthotic protection mobilization and change of shoe gear with no relief.  A discussion of surgery was had with last podiatrist and she returns today for surgical evaluation.  Patient presents to clinic today for surgical discussion in regards to her left lower extremity.  Patient states she works at the [employer] and she is on her feet a significant amount.  Patient denies any trauma to the area though she has had plantar fasciitis in the past.    The following portions of the patient's history were reviewed and updated as appropriate: allergies, current medications, past family history, past medical history, past social history, past surgical history and problem list.    Allergies[1]      Current Outpatient Medications:     ADVAIR DISKUS 250-50 MCG/ACT Inhalation Aerosol Powder, Breath Activated, Inhale 1 puff into the lungs 2 (two) times daily., Disp: 1 each, Rfl: 5    fluticasone-salmeterol 250-50 MCG/ACT Inhalation Aerosol Powder, Breath Activated, Inhale 1 puff into the lungs 2 (two) times daily. inhale 1 puff by INHALATION route 2 times every day morning and evening approximately 12 hours apart, Disp: 1 each, Rfl: 5    albuterol 108 (90 Base) MCG/ACT Inhalation Aero Soln, Inhale 2 puffs into the lungs every 4 (four) hours as needed., Disp: 25.5 g, Rfl: 3    fluticasone-salmeterol 250-50 MCG/ACT Inhalation Aerosol Powder, Breath Activated, Inhale 1 puff into the lungs 2 (two) times daily., Disp: 1 each, Rfl: 3    predniSONE 20 MG Oral Tab, Take 2 tablets once a day for 5 days (Patient not taking: Reported on  2024), Disp: 10 tablet, Rfl: 0    naproxen 500 MG Oral Tab, Take 1 tablet (500 mg total) by mouth 2 (two) times daily with meals., Disp: 60 tablet, Rfl: 0    There are no discontinued medications.    Patient Active Problem List   Diagnosis    Asthma (HCC)    Hand tingling       No past medical history on file.    No past surgical history on file.    Family History   Problem Relation Age of Onset    Hypertension Mother     Diabetes Mother        Social History     Occupational History    Not on file   Tobacco Use    Smoking status: Former     Current packs/day: 0.00     Types: Cigarettes     Quit date: 2023     Years since quittin.3    Smokeless tobacco: Never    Tobacco comments:     Patient used to smoke 0.5 packs per day, quit 2023   Vaping Use    Vaping status: Never Used   Substance and Sexual Activity    Alcohol use: Yes     Comment: rarely    Drug use: Never    Sexual activity: Not on file       ROS      Constitutional: negative for chills, fevers and sweats  Gastrointestinal: negative for abdominal pain, diarrhea, nausea and vomiting  Genitourinary:negative for dysuria and hematuria  Musculoskeletal:negative for arthralgias and muscle weakness  Neurological: negative for paresthesia and weakness  All others reviewed and negative.      Physical Exam     LE PHYSICAL EXAM    Constitution: Well-developed and well-nourished. Gait appears normal. No apparent distress. Alert and oriented to person, place, and time.  Integument: There are no varicosities. Skin appears moist, warm, and supple with positive hair growth. There are no color changes. No open lesions. No macerations, No Hyperkeratotic lesions.  Vascular examination: Dorsalis pedis and posterior tibial pulses are strong bilaterally with capillary filling time less than 3 seconds to all digits. There is no peripheral edema..  Neurological Sensorium: Grossly intact to sharp/dull. Vibratory: Intact.  Musculoskeletal:   5/5 pedal muscle strength  b/l    Pain to palpation to the second third and fourth intermetatarsal spaces of the left foot.  Hallux abductovalgus deformity noted to left foot with contracture of the PIPJ DIPJ and MPJs of the left 2nd through 4th digits.  Positive Lachman test.    Vitals: LMP 04/02/2024 (Approximate)       Assessment and Plan     Encounter Diagnoses   Name Primary?    Left foot pain Yes    Hallux valgus (acquired), left foot     Hammer toe of left foot    I do lengthy discussion with patient regards to her clinical presentation as well as her x-ray findings.  Discussed that although she does have bunion and hammertoes her left foot I feel that most of her pain is due to possible neuromas on the ball of her feet.  Placed an order for an ultrasound to assess for underlying neuroma.  Discussed bunion and hammertoe surgery in great detail.  Discussed risk and benefits of each.  Discussed that hammertoe surgery with plantar plate repair is a very unpredictable surgery that has a high rate of recurrence.  Discussed may be just addressing her bunion and neuromas at present to help relieve pain.  Patient will start with an ultrasound for further assessment.  Will discuss repeat x-rays if we decide to go down the surgical route    Patient was instructed to call the office or on-call podiatric physician immediately with any issues or concerns before the next scheduled visit. Patient to follow-up in clinic in after ultrasound      Roberta Maolne DPM, [email or web address] OBREGON  Diplomat, American Board of Foot and Ankle Surgery  Certified in Foot and Rearfoot/Ankle Reconstruction  Fellow of the American College of Foot and Ankle Surgeons  Fellowship Trained Foot and Ankle Surgeon   [facility]     10/23/2024    7:03 AM         [1] No Known Allergies

## 2024-10-29 ENCOUNTER — IMMUNIZATION (OUTPATIENT)
Dept: LAB | Age: 39
End: 2024-10-29
Attending: EMERGENCY MEDICINE
Payer: COMMERCIAL

## 2024-10-29 DIAGNOSIS — Z23 NEED FOR VACCINATION: Primary | ICD-10-CM

## 2024-10-29 PROCEDURE — 90656 IIV3 VACC NO PRSV 0.5 ML IM: CPT

## 2024-10-29 PROCEDURE — 90471 IMMUNIZATION ADMIN: CPT

## 2024-11-01 DIAGNOSIS — J45.41 MODERATE PERSISTENT ASTHMA WITH ACUTE EXACERBATION (HCC): ICD-10-CM

## 2024-11-01 RX ORDER — ALBUTEROL SULFATE 90 UG/1
2 INHALANT RESPIRATORY (INHALATION) EVERY 4 HOURS PRN
Qty: 3 EACH | Refills: 1 | Status: SHIPPED | OUTPATIENT
Start: 2024-11-01

## 2024-11-01 NOTE — TELEPHONE ENCOUNTER
Patient needs refill is out albuterol 108 (90 Base) MCG/ACT Inhalation Aero Soln  please follow up

## 2024-11-05 RX ORDER — ALBUTEROL SULFATE 90 UG/1
2 INHALANT RESPIRATORY (INHALATION) EVERY 4 HOURS PRN
Qty: 25.5 G | Refills: 3 | OUTPATIENT
Start: 2024-11-05

## 2024-11-25 ENCOUNTER — HOSPITAL ENCOUNTER (OUTPATIENT)
Dept: ULTRASOUND IMAGING | Facility: HOSPITAL | Age: 39
Discharge: HOME | End: 2024-11-25
Attending: PODIATRIST
Payer: COMMERCIAL

## 2024-11-25 DIAGNOSIS — M79.672 LEFT FOOT PAIN: ICD-10-CM

## 2024-11-25 PROCEDURE — 76881 US COMPL JOINT R-T W/IMG: CPT | Performed by: PODIATRIST

## 2024-11-26 ENCOUNTER — TELEPHONE (OUTPATIENT)
Dept: PODIATRY CLINIC | Facility: CLINIC | Age: 39
End: 2024-11-26

## 2024-11-26 ENCOUNTER — OFFICE VISIT (OUTPATIENT)
Dept: PODIATRY CLINIC | Facility: CLINIC | Age: 39
End: 2024-11-26

## 2024-11-26 DIAGNOSIS — M20.12 HALLUX VALGUS (ACQUIRED), LEFT FOOT: Primary | ICD-10-CM

## 2024-11-26 DIAGNOSIS — M79.672 LEFT FOOT PAIN: ICD-10-CM

## 2024-11-26 DIAGNOSIS — M20.42 HAMMER TOE OF LEFT FOOT: ICD-10-CM

## 2024-11-26 PROCEDURE — 99214 OFFICE O/P EST MOD 30 MIN: CPT | Performed by: PODIATRIST

## 2024-11-26 NOTE — TELEPHONE ENCOUNTER
----- Message from Roberta Arcos sent at 11/25/2024  4:56 PM CST -----  Please call patient and inform her that her ultrasound shows neuromas and a tear of a ligament in her foot.  I think given the complexity of her ultrasound results it would be best for us to set up a meeting to discuss steps moving forward.

## 2024-11-26 NOTE — TELEPHONE ENCOUNTER
Spoke with patient, confirmed ID, gave results/recommendations, and scheduled her for appointment this afternoon at 5 pm. Location confirmed.

## 2024-11-26 NOTE — PROGRESS NOTES
Reason for Visit      Salome Salamanca is a 39 year old female presents today complaining of left foot pain.     History of Present Illness     Patient presents to clinic today after being seen by podiatry a few months prior for management of her left bunion and hammertoe deformity.  At that time patient was complaining of pain at her bunion and hammertoes 2 through 4.  Patient exhausted conservative measures of orthotic protection mobilization and change of shoe gear with no relief.  A discussion of surgery was had with last podiatrist and she returns today for surgical evaluation.  Patient presents to clinic today for surgical discussion in regards to her left lower extremity.  Patient states she works at the [employer] and she is on her feet a significant amount.  Patient denies any trauma to the area though she has had plantar fasciitis in the past.    Patient returns to clinic today to review her ultrasound results and discuss treatment moving forward.    The following portions of the patient's history were reviewed and updated as appropriate: allergies, current medications, past family history, past medical history, past social history, past surgical history and problem list.    Allergies[1]      Current Outpatient Medications:     albuterol 108 (90 Base) MCG/ACT Inhalation Aero Soln, Inhale 2 puffs into the lungs every 4 (four) hours as needed., Disp: 3 each, Rfl: 1    ADVAIR DISKUS 250-50 MCG/ACT Inhalation Aerosol Powder, Breath Activated, Inhale 1 puff into the lungs 2 (two) times daily., Disp: 1 each, Rfl: 5    fluticasone-salmeterol 250-50 MCG/ACT Inhalation Aerosol Powder, Breath Activated, Inhale 1 puff into the lungs 2 (two) times daily. inhale 1 puff by INHALATION route 2 times every day morning and evening approximately 12 hours apart, Disp: 1 each, Rfl: 5    fluticasone-salmeterol 250-50 MCG/ACT Inhalation Aerosol Powder, Breath Activated, Inhale 1 puff into the lungs 2 (two) times daily., Disp: 1 each,  Rfl: 3    naproxen 500 MG Oral Tab, Take 1 tablet (500 mg total) by mouth 2 (two) times daily with meals., Disp: 60 tablet, Rfl: 0    predniSONE 20 MG Oral Tab, Take 2 tablets once a day for 5 days (Patient not taking: Reported on 2024), Disp: 10 tablet, Rfl: 0    There are no discontinued medications.    Patient Active Problem List   Diagnosis    Asthma (HCC)    Hand tingling       No past medical history on file.    No past surgical history on file.    Family History   Problem Relation Age of Onset    Hypertension Mother     Diabetes Mother        Social History     Occupational History    Not on file   Tobacco Use    Smoking status: Former     Current packs/day: 0.00     Types: Cigarettes     Quit date: 2023     Years since quittin.4    Smokeless tobacco: Never    Tobacco comments:     Patient used to smoke 0.5 packs per day, quit 2023   Vaping Use    Vaping status: Never Used   Substance and Sexual Activity    Alcohol use: Yes     Comment: rarely    Drug use: Never    Sexual activity: Not on file       ROS      Constitutional: negative for chills, fevers and sweats  Gastrointestinal: negative for abdominal pain, diarrhea, nausea and vomiting  Genitourinary:negative for dysuria and hematuria  Musculoskeletal:negative for arthralgias and muscle weakness  Neurological: negative for paresthesia and weakness  All others reviewed and negative.      Physical Exam     LE PHYSICAL EXAM    Constitution: Well-developed and well-nourished. Gait appears normal. No apparent distress. Alert and oriented to person, place, and time.  Integument: There are no varicosities. Skin appears moist, warm, and supple with positive hair growth. There are no color changes. No open lesions. No macerations, No Hyperkeratotic lesions.  Vascular examination: Dorsalis pedis and posterior tibial pulses are strong bilaterally with capillary filling time less than 3 seconds to all digits. There is no peripheral edema..  Neurological  Sensorium: Grossly intact to sharp/dull. Vibratory: Intact.  Musculoskeletal:   5/5 pedal muscle strength b/l    Pain to palpation to the second third and fourth intermetatarsal spaces of the left foot.  Hallux abductovalgus deformity noted to left foot with contracture of the PIPJ DIPJ and MPJs of the left 2nd through 4th digits.  Positive Lachman test.    Vitals: LMP 04/02/2024 (Approximate)   CONCLUSION:   1. Mild hallux valgus with lateral rotation of the sesamoid bones.   2. Lateral deviation of the 2nd through 4th toes.  This limits assessment of the 2nd intermetatarsal bursa, where there is an area of ill-defined hypoechogenicity along the interdigital nerve.  Differentiation between a Valdez's neuroma and bursitis is   therefore difficult due to limited ability to obtain adequate manual compression.   3. Small Valdez's neuroma within the 3rd intermetatarsal space.   4. Complete tear of the 3rd plantar plate with chronic degeneration of the 2nd and 4th plantar plates.            Assessment and Plan     Encounter Diagnoses   Name Primary?    Hallux valgus (acquired), left foot Yes    Hammer toe of left foot     Left foot pain      I do lengthy discussion with patient regards to her clinical presentation as well as her x-ray findings.  Discussed that although she does have bunion and hammertoes her left foot I feel that most of her pain is due to possible neuromas on the ball of her feet.  Placed an order for an ultrasound to assess for underlying neuroma.  Discussed bunion and hammertoe surgery in great detail.  Discussed risk and benefits of each.  Discussed that hammertoe surgery with plantar plate repair is a very unpredictable surgery that has a high rate of recurrence.  Discussed may be just addressing her bunion and neuromas at present to help relieve pain.  Patient will start with an ultrasound for further assessment.  Will discuss repeat x-rays if we decide to go down the surgical route      Had a  lengthy discussion with patient in regards to her ultrasound findings.  Discussed bunion plantar plate tears sprains and neuromas of the ball of her foot.  Discussed risk and benefit of surgical intervention of each.  Discussed make an concern with nerve damage numbness with removal of neuromas as well as plantar plate repair with tightness of the digits.  Discussed just fixing the bunion only as well with the hope of trying to offload the ball of her foot.  Patient will think about her options.  Recommend a second opinion at this time 2.    Patient was instructed to call the office or on-call podiatric physician immediately with any issues or concerns before the next scheduled visit. Patient to follow-up in clinic in after ultrasound      Roberta Malone DPM, [email or web address] OBREGON  Diplomat, American Board of Foot and Ankle Surgery  Certified in Foot and Rearfoot/Ankle Reconstruction  Fellow of the American College of Foot and Ankle Surgeons  Fellowship Trained Foot and Ankle Surgeon   [facility]     10/23/2024    7:03 AM         [1] No Known Allergies

## 2024-12-05 ENCOUNTER — PATIENT MESSAGE (OUTPATIENT)
Dept: PULMONOLOGY | Facility: CLINIC | Age: 39
End: 2024-12-05

## 2024-12-05 RX ORDER — METHYLPREDNISOLONE 4 MG/1
TABLET ORAL
Qty: 21 TABLET | Refills: 0 | Status: SHIPPED | OUTPATIENT
Start: 2024-12-05

## 2024-12-10 ENCOUNTER — OFFICE VISIT (OUTPATIENT)
Dept: ALLERGY | Facility: CLINIC | Age: 39
End: 2024-12-10

## 2024-12-10 ENCOUNTER — NURSE ONLY (OUTPATIENT)
Dept: ALLERGY | Facility: CLINIC | Age: 39
End: 2024-12-10

## 2024-12-10 DIAGNOSIS — H10.10 SEASONAL AND PERENNIAL ALLERGIC RHINOCONJUNCTIVITIS: Primary | ICD-10-CM

## 2024-12-10 DIAGNOSIS — J30.89 ENVIRONMENTAL AND SEASONAL ALLERGIES: Primary | ICD-10-CM

## 2024-12-10 DIAGNOSIS — Z23 NEED FOR COVID-19 VACCINE: ICD-10-CM

## 2024-12-10 DIAGNOSIS — J30.2 SEASONAL AND PERENNIAL ALLERGIC RHINOCONJUNCTIVITIS: Primary | ICD-10-CM

## 2024-12-10 DIAGNOSIS — J30.89 SEASONAL AND PERENNIAL ALLERGIC RHINOCONJUNCTIVITIS: Primary | ICD-10-CM

## 2024-12-10 DIAGNOSIS — J45.40 MODERATE PERSISTENT EXTRINSIC ASTHMA WITHOUT COMPLICATION (HCC): ICD-10-CM

## 2024-12-10 DIAGNOSIS — Z23 FLU VACCINE NEED: ICD-10-CM

## 2024-12-10 PROCEDURE — 99204 OFFICE O/P NEW MOD 45 MIN: CPT | Performed by: ALLERGY & IMMUNOLOGY

## 2024-12-10 PROCEDURE — 90471 IMMUNIZATION ADMIN: CPT | Performed by: ALLERGY & IMMUNOLOGY

## 2024-12-10 PROCEDURE — 95024 IQ TESTS W/ALLERGENIC XTRCS: CPT | Performed by: ALLERGY & IMMUNOLOGY

## 2024-12-10 PROCEDURE — 90677 PCV20 VACCINE IM: CPT | Performed by: ALLERGY & IMMUNOLOGY

## 2024-12-10 PROCEDURE — 95004 PERQ TESTS W/ALRGNC XTRCS: CPT | Performed by: ALLERGY & IMMUNOLOGY

## 2024-12-10 RX ORDER — AZELASTINE 1 MG/ML
2 SPRAY, METERED NASAL 2 TIMES DAILY
Qty: 3 EACH | Refills: 0 | Status: SHIPPED | OUTPATIENT
Start: 2024-12-10

## 2024-12-10 NOTE — PROGRESS NOTES
Salome Salamanca is a 39 year old female.    HPI:     Chief Complaint   Patient presents with    Allergies     Pt reports she knows she is allergic to dogs. Stuffy nose, itchy ears, sneezing. Hives upon contact with certain dogs. She was okay with the poodle, but other dogs she has trouble. Hives only come in contact with the dogs.      Patient is a 31-year-old female who presents for allergy evaluation with a chief complaint of allergies    Medication list include Advair 250/50 and albuterol    Immunizations reviewed.  COVID-vaccine x 4 doses last in 2022  Booster in 2023  Pneumonia vaccine from 2024.  Flu vaccine up-to-date from 2024    Today patient reports      Allergies   Duration:  years    Timing:  + summer   Symptoms: hives with contact with dogs, runny nose sz nc    Severity: moderate  Denies oral swelling or resp issues   Status: no change  Triggers: allergies dogs   Tried: zyrtec helps   Pets: + dog   Nonsmoker . Quit 2 yr ago     Hx of asthma, ad, or food allergy:  + asthma   Denies current symptoms or symptoms > 2 days per week   Advair bid  Alb prn    No intubations   Ed or pred over past year : pred x  over past year     Flu utd         HISTORY:  History reviewed. No pertinent past medical history.   History reviewed. No pertinent surgical history.   Family History   Problem Relation Age of Onset    Hypertension Mother     Diabetes Mother       Social History:   Social History     Socioeconomic History    Marital status: Legally    Tobacco Use    Smoking status: Former     Current packs/day: 0.00     Types: Cigarettes     Quit date: 2023     Years since quittin.5    Smokeless tobacco: Never    Tobacco comments:     Patient used to smoke 0.5 packs per day, quit 2023   Vaping Use    Vaping status: Never Used   Substance and Sexual Activity    Alcohol use: Yes     Comment: rarely    Drug use: Never     Social Drivers of Health     Physical Activity:  Unknown (4/11/2020)    [facility]    Exercise Vital Sign     Days of Exercise per Week: 0 days    Received from [facility]    Housing Stability        Medications (Active prior to today's visit):  Current Outpatient Medications   Medication Sig Dispense Refill    azelastine 0.1 % Nasal Solution 2 sprays by Nasal route 2 (two) times daily. 3 each 0    methylPREDNISolone 4 MG Oral Tablet Therapy Pack Take per package insert (instructions). Take as directed on the box 21 tablet 0    albuterol 108 (90 Base) MCG/ACT Inhalation Aero Soln Inhale 2 puffs into the lungs every 4 (four) hours as needed. 3 each 1    ADVAIR DISKUS 250-50 MCG/ACT Inhalation Aerosol Powder, Breath Activated Inhale 1 puff into the lungs 2 (two) times daily. 1 each 5    naproxen 500 MG Oral Tab Take 1 tablet (500 mg total) by mouth 2 (two) times daily with meals. 60 tablet 0    fluticasone-salmeterol 250-50 MCG/ACT Inhalation Aerosol Powder, Breath Activated Inhale 1 puff into the lungs 2 (two) times daily. inhale 1 puff by INHALATION route 2 times every day morning and evening approximately 12 hours apart 1 each 5    fluticasone-salmeterol 250-50 MCG/ACT Inhalation Aerosol Powder, Breath Activated Inhale 1 puff into the lungs 2 (two) times daily. 1 each 3       Allergies:  Allergies[1]      ROS:     Allergic/Immuno:  See HPI  Cardiovascular:  Negative for irregular heartbeat/palpitations, chest pain, edema  Constitutional:  Negative night sweats,weight loss, irritability and lethargy  Endocrine:  Negative for cold intolerance, polydipsia and polyphagia  ENMT:  Negative for ear drainage, hearing loss and nasal drainage  Eyes:  Negative for eye discharge and vision loss  Gastrointestinal:  Negative for abdominal pain, diarrhea and vomiting  Genitourinary:  Negative for dysuria and hematuria  Hema/Lymph:  Negative for easy bleeding and easy bruising  Integumentary:  Negative for  pruritus and rash  Musculoskeletal:  Negative for joint symptoms  Neurological:  Negative for dizziness, seizures  Psychiatric:  Negative for inappropriate interaction and psychiatric symptoms  Respiratory:  Negative for cough, dyspnea and wheezing      PHYSICAL EXAM:   Constitutional: responsive, no acute distress noted  Head/Face: NC/Atraumatic  Eyes/Vision: conjunctiva and lids are normal extraocular motion is intact   Ears/Audiometry: tympanic membranes are normal bilaterally hearing is grossly intact  Nose/Mouth/Throat: nose and throat are clear mucous membranes are moist   Neck/Thyroid: neck is supple without adenopathy  Lymphatic: no abnormal cervical, supraclavicular or axillary adenopathy is noted  Respiratory: normal to inspection lungs are clear to auscultation bilaterally normal respiratory effort   Cardiovascular: regular rate and rhythm no murmurs, gallups, or rubs  Abdomen: soft non-tender non-distended  Skin/Hair: no unusual rashes present  Extremities: no edema, cyanosis, or clubbing  Neurological:Oriented to time, place, person & situation       ASSESSMENT/PLAN:   Assessment   Encounter Diagnoses   Name Primary?    Seasonal and perennial allergic rhinoconjunctivitis Yes    Flu vaccine need     Need for COVID-19 vaccine     Moderate persistent extrinsic asthma without complication (HCC)      Skin testing today to common indoor and outdoor environmental allergies was positive to trees mold dust mite cat dog cockroach    Positive histamine control    #1 allergic rhinitis  see above clinical history.  Contact hives with dogs as well as seasonal concerns.  There are dogs in the home  See above skin testing to screen for allergic triggers.  Reviewed avoidance measures and potential treatment option immunotherapy  Zyrtec once a day up to 2-4 times per needed if hives are not controlled  Add trial of Astelin nasal spray 2 sprays per nostril up to twice a day if having prominent nasal congestion postnasal  drip    2.  Asthma  Stable at this time with current Advair twice a day.  Continue with Advair  Reviewed signs and symptoms of persistent asthma including the rules of 2  Albuterol 2 puffs every 4-6 hours if having active coughing wheezing or shortness of breath    3.  Flu vaccine up-to-date from the fall    4.  COVID-vaccine booster recommended.  Most recent booster is in 2024.  Please check with local pharmacy as we do not stock the vaccine    5.  Pneumonia vaccine up-to-date from 2024 given history of asthma  Prevnar 20 given in office today given history of asthma           Orders This Visit:  Orders Placed This Encounter   Procedures    Prevnar 20 (PCV20) [65807]       Meds This Visit:  Requested Prescriptions     Signed Prescriptions Disp Refills    azelastine 0.1 % Nasal Solution 3 each 0     Si sprays by Nasal route 2 (two) times daily.       Imaging & Referrals:  PCV20 VACCINE FOR INTRAMUSCULAR USE     12/10/2024  Robert Lee MD      If medication samples were provided today, they were provided solely for patient education and training related to self administration of these medications.  Teaching, instruction and sample was provided to the patient by myself.  Teaching included  a review of potential adverse side effects as well as potential efficacy.  Patient's questions were answered in regards to medication administration and dosing and potential side effects. Teaching was provided via the teach back method         [1] No Known Allergies

## 2025-02-14 DIAGNOSIS — J45.41 MODERATE PERSISTENT ASTHMA WITH ACUTE EXACERBATION (HCC): ICD-10-CM

## 2025-02-14 RX ORDER — ALBUTEROL SULFATE 90 UG/1
2 INHALANT RESPIRATORY (INHALATION) EVERY 4 HOURS PRN
Qty: 3 EACH | Refills: 1 | Status: SHIPPED | OUTPATIENT
Start: 2025-02-14

## 2025-02-14 NOTE — TELEPHONE ENCOUNTER
Dr. Perez- please sign pended order for Albuterol 108, if agreeable    Last office visit: 9/30/2024 Follow up: none Last refill: 11/1/2024

## 2025-02-20 RX ORDER — FLUTICASONE PROPIONATE AND SALMETEROL 250; 50 UG/1; UG/1
1 POWDER RESPIRATORY (INHALATION) 2 TIMES DAILY
Qty: 60 EACH | Refills: 0 | Status: SHIPPED | OUTPATIENT
Start: 2025-02-20

## 2025-02-20 RX ORDER — ALBUTEROL SULFATE 90 UG/1
2 INHALANT RESPIRATORY (INHALATION) EVERY 4 HOURS PRN
Qty: 8.5 G | Refills: 0 | OUTPATIENT
Start: 2025-02-20

## 2025-03-27 ENCOUNTER — ORDER TRANSCRIPTION (OUTPATIENT)
Dept: PHYSICAL THERAPY | Facility: HOSPITAL | Age: 40
End: 2025-03-27

## 2025-03-27 DIAGNOSIS — S99.922D INJURY OF PLANTAR PLATE OF LEFT FOOT, SUBSEQUENT ENCOUNTER: Primary | ICD-10-CM

## 2025-04-16 ENCOUNTER — TELEPHONE (OUTPATIENT)
Dept: PHYSICAL THERAPY | Facility: HOSPITAL | Age: 40
End: 2025-04-16

## 2025-04-18 ENCOUNTER — TELEPHONE (OUTPATIENT)
Dept: PHYSICAL THERAPY | Facility: HOSPITAL | Age: 40
End: 2025-04-18

## 2025-04-21 ENCOUNTER — OFFICE VISIT (OUTPATIENT)
Dept: PHYSICAL THERAPY | Age: 40
End: 2025-04-21
Attending: PODIATRIST
Payer: COMMERCIAL

## 2025-04-21 PROCEDURE — 97110 THERAPEUTIC EXERCISES: CPT

## 2025-04-21 PROCEDURE — 97162 PT EVAL MOD COMPLEX 30 MIN: CPT

## 2025-04-22 NOTE — PROGRESS NOTES
LOWER EXTREMITY EVALUATION:     Diagnosis:   Injury of plantar plate of left foot, subsequent encounter (V08.343V) Patient:  Salome Salamanca (40 year old, female)        Referring Provider: Shay Rodriguez Dpm  Today's Date   4/22/2025    Precautions:  None   Date of Evaluation: 04/21/25  Next MD visit: -  Date of Surgery: na     PATIENT SUMMARY   Summary of chief complaints: L plantar foot pain  History of current condition: Approx one yr history of L plantar foot pain with insidious onset.  She had been experiencing R plantar fasciitis approx one year ago as well, though that has resolved.  She describes that the doctor told her she had L torn ligament, neuroma and bunion.  She works for [employer] at [employer] so is required to stand for most of her 8-10 hr shift.  She admits her symptoms vary with her weight and currently she is heavy for her.   Pain level: current 3 /10, at best 3 /10, at worst 9 /10  Description of symptoms: plantar pain   Occupation: [employer]   Leisure activities/Hobbies: na   Prior level of function: without restriction due to feet  Current limitations: limited upright activity tolerance especially prolonged standing  Pt goals: elimination of pain  Past medical history was reviewed with Salome.  Significant findings include: obesity, R plantar fasciitis  Imaging/Tests: -   Salome  has no past medical history on file.  She  has no past surgical history on file.    ASSESSMENT  Salome presents to physical therapy evaluation with primary c/o L plantar foot pain. The results of the objective tests and measures show Limited ROM B ankles, weakness L ankle/foot, poor balance. Functional deficits include but are not limited to limited upright activity tolerance especially prolonged standing. Signs and symptoms are consistent with diagnosis of Injury of plantar plate of left foot, subsequent encounter (G00.091T). Pt and PT discussed evaluation findings, pathology, POC and HEP.  Pt voiced understanding and performs  HEP correctly without reported pain. Skilled Physical Therapy is medically necessary to address the above impairments and reach functional goals.    OBJECTIVE:    Musculoskeletal  Observation: Obese woman in no apparent distress  Palpation: TTP L plantar surface especially midfoot and distal including FHL tendon and ball of foot; no TTP at plantar fascia at L heel   Accessory Motion: limited motion throughout rearfeet B     Edema: moderate L ankle     ROM and Strength: (* denotes performed with pain)  Ankle/Foot   ROM MMT (-/5)    R L R L     PF WNL's WNL's 5 4     DF (L4) WNL's WNL's 5 4     Inversion minimal minimal 5 4-     Eversion minimal minimal 5 4-       Balance and Functional Mobility:  Gait: pt ambulates on level ground with normal mechanics  SLS: R 30 sec,  L 20 sec     Today's Treatment and Response:   Pt education was provided on exam findings, treatment diagnosis, treatment plan, expectations, and prognosis.  Today's Treatment       4/21/2025   LE Treatment   Therapeutic Exercise - Discuss daily icing (via submersion)  - Gastroc, soleus and FHL stretch variations  - AROM ankle DF/PF, Inv/Ever, circles, alphabet  - Toe scrunches, yoga/ext, spreading  - SLS on level, eyes open   Therapeutic Exercise Minutes 25   Evaluation Minutes 20   Total Time Of Timed Procedures 25   Total Time Of Service-Based Procedures 20   Total Treatment Time 45   HEP Texted to pt:  Access Code: [email or web address]  URL: [email or web address]  Date: 04/21/2025  Prepared by: Robert Mills    Exercises  - Supine Ankle Dorsiflexion and Plantarflexion AROM  - 1 x daily - 7 x weekly - 3 sets - 10 reps  - Supine Ankle Inversion and Eversion AROM  - 1 x daily - 7 x weekly - 3 sets - 10 reps  - Seated Ankle Circles  - 1 x daily - 7 x weekly - 3 sets - 10 reps  - Seated Ankle Alphabet  - 1 x daily - 7 x weekly - 3 sets - 10 reps  - Seated Toe Towel Scrunches  - 1 x daily - 7 x weekly - 3 sets - 10 reps  - Toe Yoga - Alternating  Great Toe and Lesser Toe Extension  - 1 x daily - 7 x weekly - 3 sets - 10 reps  - Toe Spreading  - 1 x daily - 7 x weekly - 3 sets - 10 reps  - Gastroc Stretch with Foot at Wall  - 1 x daily - 7 x weekly - 3 sets - 10 reps - 30 seconds hold  - Gastroc Stretch on Wall  - 1 x daily - 7 x weekly - 3 sets - 10 reps - 30 seconds hold  - Soleus Stretch on Wall  - 1 x daily - 7 x weekly - 3 sets - 10 reps - 30 seconds hold  - Single Leg Stance  - 1 x daily - 7 x weekly - 3 sets - 10 reps        Patient was instructed in and issued a HEP for: Texted to pt:  Access Code: [email or web address]  URL: [email or web address]  Date: 04/21/2025  Prepared by: Robert Mills    Exercises  - Supine Ankle Dorsiflexion and Plantarflexion AROM  - 1 x daily - 7 x weekly - 3 sets - 10 reps  - Supine Ankle Inversion and Eversion AROM  - 1 x daily - 7 x weekly - 3 sets - 10 reps  - Seated Ankle Circles  - 1 x daily - 7 x weekly - 3 sets - 10 reps  - Seated Ankle Alphabet  - 1 x daily - 7 x weekly - 3 sets - 10 reps  - Seated Toe Towel Scrunches  - 1 x daily - 7 x weekly - 3 sets - 10 reps  - Toe Yoga - Alternating Great Toe and Lesser Toe Extension  - 1 x daily - 7 x weekly - 3 sets - 10 reps  - Toe Spreading  - 1 x daily - 7 x weekly - 3 sets - 10 reps  - Gastroc Stretch with Foot at Wall  - 1 x daily - 7 x weekly - 3 sets - 10 reps - 30 seconds hold  - Gastroc Stretch on Wall  - 1 x daily - 7 x weekly - 3 sets - 10 reps - 30 seconds hold  - Soleus Stretch on Wall  - 1 x daily - 7 x weekly - 3 sets - 10 reps - 30 seconds hold  - Single Leg Stance  - 1 x daily - 7 x weekly - 3 sets - 10 reps    Charges:  PT EVAL: Moderate Complexity, Ther Ex x2  In agreement with evaluation findings and clinical rationale, this evaluation involved MODERATE COMPLEXITY decision making due to 1-2 personal factors/comorbidities, 3 or more body structures involved/activity limitations, and evolving symptoms as documented in the evaluation.                                                                                                                 PLAN OF CARE:    Goals: (to be met in 12 visits)   Pt report min/no pain  Pt reports tolerating standing at work  Pt demonstrate independence with HEP  Pt report no functional limitations     Frequency / Duration: Patient will be seen 2x/week or a total of 12  visits over a 90 day period. Treatment will include: Manual Therapy; Neuromuscular Re-education; Therapeutic Activities; Therapeutic Exercise; Home Exercise Program instruction    Education or treatment limitation: None   Rehab Potential: excellent     LEFS Score  No data recorded    Patient/Family/Caregiver was advised of these findings, precautions, and treatment options and has agreed to actively participate in planning and for this course of care.    Thank you for your referral. Please co-sign or sign and return this letter via fax as soon as possible to [phone]. If you have any questions, please contact me at Dept: [phone]    Sincerely,  Electronically signed by therapist: Robert Mills, PT  Physician's certification required: Yes  I certify the need for these services furnished under this plan of treatment and while under my care.    X___________________________________________________ Date____________________    Certification From: 4/22/2025  To:7/21/2025

## 2025-04-25 ENCOUNTER — OFFICE VISIT (OUTPATIENT)
Dept: PHYSICAL THERAPY | Age: 40
End: 2025-04-25
Attending: PODIATRIST
Payer: COMMERCIAL

## 2025-04-25 DIAGNOSIS — S99.922D INJURY OF PLANTAR PLATE OF LEFT FOOT, SUBSEQUENT ENCOUNTER: Primary | ICD-10-CM

## 2025-04-25 PROCEDURE — 97110 THERAPEUTIC EXERCISES: CPT

## 2025-04-25 PROCEDURE — 97112 NEUROMUSCULAR REEDUCATION: CPT

## 2025-04-25 NOTE — PROGRESS NOTES
Patient: Salome Salamanca (40 year old, female) Referring Provider:  Insurance:   Diagnosis: Injury of plantar plate of left foot, subsequent encounter (U56.162G) Shay Rodriguez Dpm  [place]   Date of Surgery: na Next MD visit:  N/A   Precautions:  None - Referral Information:    Date of Evaluation: Req: 1, Auth: 1, Exp: 3/27/2026    04/21/25 POC Auth Visits:  12       Today's Date   4/25/2025    Subjective  Doing HEP, well, trying but toes aren't moving all that well.       Pain: 2/10     Objective  See below treatment grid.            Assessment  Improved comfort with increased muscle engagement in foot/ankle.    Goals (to be met in 12 visits)   Pt report min/no pain  Pt reports tolerating standing at work  Pt demonstrate independence with HEP  Pt report no functional limitations       Plan  Progress balance and foot/ankle strengthening as able.    Treatment Last 4 Visits  Treatment Day: 2       4/21/2025 4/25/2025   LE Treatment   Therapeutic Exercise - Discuss daily icing (via submersion)  - Gastroc, soleus and FHL stretch variations  - AROM ankle DF/PF, Inv/Ever, circles, alphabet  - Toe scrunches, yoga/ext, spreading  - SLS on level, eyes open - Recumbent bike L5 x7'  - Gastroc, soleus, FHL stretches  - AROM L ankle  - Toe scrunches  - Toe yoga  - Toe spreading   Neuro Re-Education  - SLS variations (on level/foam, eyes open/closed)  - BOSU lat step up/overs  - BOSU fwd step up to runners pose.   Therapeutic Exercise Minutes 25 20   Neuro Re-Educ Minutes  25   Evaluation Minutes 20    Total Time Of Timed Procedures 25 45   Total Time Of Service-Based Procedures 20 0   Total Treatment Time 45 45   HEP Texted to pt:  Access Code: [email or web address]  URL: [email or web address]  Date: 04/21/2025  Prepared by: Robert Mills    Exercises  - Supine Ankle Dorsiflexion and Plantarflexion AROM  - 1 x daily - 7 x weekly - 3 sets - 10 reps  - Supine Ankle Inversion and Eversion AROM  - 1 x daily - 7 x weekly  - 3 sets - 10 reps  - Seated Ankle Circles  - 1 x daily - 7 x weekly - 3 sets - 10 reps  - Seated Ankle Alphabet  - 1 x daily - 7 x weekly - 3 sets - 10 reps  - Seated Toe Towel Scrunches  - 1 x daily - 7 x weekly - 3 sets - 10 reps  - Toe Yoga - Alternating Great Toe and Lesser Toe Extension  - 1 x daily - 7 x weekly - 3 sets - 10 reps  - Toe Spreading  - 1 x daily - 7 x weekly - 3 sets - 10 reps  - Gastroc Stretch with Foot at Wall  - 1 x daily - 7 x weekly - 3 sets - 10 reps - 30 seconds hold  - Gastroc Stretch on Wall  - 1 x daily - 7 x weekly - 3 sets - 10 reps - 30 seconds hold  - Soleus Stretch on Wall  - 1 x daily - 7 x weekly - 3 sets - 10 reps - 30 seconds hold  - Single Leg Stance  - 1 x daily - 7 x weekly - 3 sets - 10 reps         HEP  Texted to pt:  Access Code: [email or web address]  URL: [email or web address]  Date: 04/21/2025  Prepared by: Robert Mills    Exercises  - Supine Ankle Dorsiflexion and Plantarflexion AROM  - 1 x daily - 7 x weekly - 3 sets - 10 reps  - Supine Ankle Inversion and Eversion AROM  - 1 x daily - 7 x weekly - 3 sets - 10 reps  - Seated Ankle Circles  - 1 x daily - 7 x weekly - 3 sets - 10 reps  - Seated Ankle Alphabet  - 1 x daily - 7 x weekly - 3 sets - 10 reps  - Seated Toe Towel Scrunches  - 1 x daily - 7 x weekly - 3 sets - 10 reps  - Toe Yoga - Alternating Great Toe and Lesser Toe Extension  - 1 x daily - 7 x weekly - 3 sets - 10 reps  - Toe Spreading  - 1 x daily - 7 x weekly - 3 sets - 10 reps  - Gastroc Stretch with Foot at Wall  - 1 x daily - 7 x weekly - 3 sets - 10 reps - 30 seconds hold  - Gastroc Stretch on Wall  - 1 x daily - 7 x weekly - 3 sets - 10 reps - 30 seconds hold  - Soleus Stretch on Wall  - 1 x daily - 7 x weekly - 3 sets - 10 reps - 30 seconds hold  - Single Leg Stance  - 1 x daily - 7 x weekly - 3 sets - 10 reps    Charges     Ther Ex, Neuro Re-Ed

## 2025-04-28 ENCOUNTER — OFFICE VISIT (OUTPATIENT)
Dept: PHYSICAL THERAPY | Age: 40
End: 2025-04-28
Attending: PODIATRIST
Payer: COMMERCIAL

## 2025-04-28 PROCEDURE — 97110 THERAPEUTIC EXERCISES: CPT

## 2025-04-28 PROCEDURE — 97112 NEUROMUSCULAR REEDUCATION: CPT

## 2025-04-28 NOTE — PROGRESS NOTES
Patient: Salome Salamanca (40 year old, female) Referring Provider:  Insurance:   Diagnosis: Injury of plantar plate of left foot, subsequent encounter (I60.724I) Shay Rodriguez Dpm  [place]   Date of Surgery: na Next MD visit:  N/A   Precautions:  None - Referral Information:    Date of Evaluation: Req: 0, Auth: 0, Exp:     04/21/25 POC Auth Visits:  12       Today's Date   4/28/2025    Subjective  Doing HEP.  Balance is still pretty difficulty.  Foot is a little better.       Pain: 2/10     Objective  See below treatment grid.          Assessment  Improving balance without pain, but achy w/ fatigue.    Goals (to be met in 12 visits)   Pt report min/no pain  Pt reports tolerating standing at work  Pt demonstrate independence with HEP  Pt report no functional limitations         Plan  Progress balance and foot/ankle strengthening as able.    Treatment Last 4 Visits  Treatment Day: 3       4/21/2025 4/25/2025 4/28/2025   LE Treatment   Therapeutic Exercise - Discuss daily icing (via submersion)  - Gastroc, soleus and FHL stretch variations  - AROM ankle DF/PF, Inv/Ever, circles, alphabet  - Toe scrunches, yoga/ext, spreading  - SLS on level, eyes open - Recumbent bike L5 x7'  - Gastroc, soleus, FHL stretches  - AROM L ankle  - Toe scrunches  - Toe yoga  - Toe spreading - Recumbent bike L5 x7'   - Gastroc, soleus, FHL stretches   - AROM L ankle   - Toe scrunches   - Toe yoga   - Toe spreading      Neuro Re-Education  - SLS variations (on level/foam, eyes open/closed)  - BOSU lat step up/overs  - BOSU fwd step up to runners pose. - SLS variations (on level/foam, eyes open/closed, BOSU)   - BOSU lat step up/overs   - BOSU fwd step up to runners pose.      Therapeutic Exercise Minutes 25 20 20   Neuro Re-Educ Minutes  25 25   Evaluation Minutes 20     Total Time Of Timed Procedures 25 45 45   Total Time Of Service-Based Procedures 20 0 0   Total Treatment Time 45 45 45   HEP Texted to pt:  Access Code: [email or web address]  URL:  [email or web address]  Date: 04/21/2025  Prepared by: Robert Mills    Exercises  - Supine Ankle Dorsiflexion and Plantarflexion AROM  - 1 x daily - 7 x weekly - 3 sets - 10 reps  - Supine Ankle Inversion and Eversion AROM  - 1 x daily - 7 x weekly - 3 sets - 10 reps  - Seated Ankle Circles  - 1 x daily - 7 x weekly - 3 sets - 10 reps  - Seated Ankle Alphabet  - 1 x daily - 7 x weekly - 3 sets - 10 reps  - Seated Toe Towel Scrunches  - 1 x daily - 7 x weekly - 3 sets - 10 reps  - Toe Yoga - Alternating Great Toe and Lesser Toe Extension  - 1 x daily - 7 x weekly - 3 sets - 10 reps  - Toe Spreading  - 1 x daily - 7 x weekly - 3 sets - 10 reps  - Gastroc Stretch with Foot at Wall  - 1 x daily - 7 x weekly - 3 sets - 10 reps - 30 seconds hold  - Gastroc Stretch on Wall  - 1 x daily - 7 x weekly - 3 sets - 10 reps - 30 seconds hold  - Soleus Stretch on Wall  - 1 x daily - 7 x weekly - 3 sets - 10 reps - 30 seconds hold  - Single Leg Stance  - 1 x daily - 7 x weekly - 3 sets - 10 reps          HEP  Texted to pt:  Access Code: [email or web address]  URL: [email or web address]  Date: 04/21/2025  Prepared by: Robert Shanjali    Exercises  - Supine Ankle Dorsiflexion and Plantarflexion AROM  - 1 x daily - 7 x weekly - 3 sets - 10 reps  - Supine Ankle Inversion and Eversion AROM  - 1 x daily - 7 x weekly - 3 sets - 10 reps  - Seated Ankle Circles  - 1 x daily - 7 x weekly - 3 sets - 10 reps  - Seated Ankle Alphabet  - 1 x daily - 7 x weekly - 3 sets - 10 reps  - Seated Toe Towel Scrunches  - 1 x daily - 7 x weekly - 3 sets - 10 reps  - Toe Yoga - Alternating Great Toe and Lesser Toe Extension  - 1 x daily - 7 x weekly - 3 sets - 10 reps  - Toe Spreading  - 1 x daily - 7 x weekly - 3 sets - 10 reps  - Gastroc Stretch with Foot at Wall  - 1 x daily - 7 x weekly - 3 sets - 10 reps - 30 seconds hold  - Gastroc Stretch on Wall  - 1 x daily - 7 x weekly - 3 sets - 10 reps - 30 seconds hold  - Soleus Stretch  on Wall  - 1 x daily - 7 x weekly - 3 sets - 10 reps - 30 seconds hold  - Single Leg Stance  - 1 x daily - 7 x weekly - 3 sets - 10 reps    Charges     THer Ex, Neuro Re-Ed

## 2025-04-30 ENCOUNTER — APPOINTMENT (OUTPATIENT)
Dept: PHYSICAL THERAPY | Age: 40
End: 2025-04-30
Attending: PODIATRIST
Payer: COMMERCIAL

## 2025-05-02 ENCOUNTER — APPOINTMENT (OUTPATIENT)
Dept: PHYSICAL THERAPY | Age: 40
End: 2025-05-02
Attending: PODIATRIST
Payer: COMMERCIAL

## 2025-05-05 ENCOUNTER — APPOINTMENT (OUTPATIENT)
Dept: PHYSICAL THERAPY | Age: 40
End: 2025-05-05
Attending: PODIATRIST
Payer: COMMERCIAL

## 2025-05-05 DIAGNOSIS — J45.41 MODERATE PERSISTENT ASTHMA WITH ACUTE EXACERBATION (HCC): ICD-10-CM

## 2025-05-05 RX ORDER — ALBUTEROL SULFATE 90 UG/1
2 INHALANT RESPIRATORY (INHALATION) EVERY 4 HOURS PRN
Qty: 3 EACH | Refills: 1 | Status: SHIPPED | OUTPATIENT
Start: 2025-05-05

## 2025-05-05 NOTE — TELEPHONE ENCOUNTER
Last office visit: 9/30/24  Upcoming appointment: No upcoming appointment scheduled   Last refill: 2/14/25    Patient requesting refill for albuterol inhaler, patient will be going out of town tomorrow and needs refill by tomorrow morning.     Dr. Perez: Please review/sign pended refill request.

## 2025-05-05 NOTE — TELEPHONE ENCOUNTER
Patient is requesting for inhalers to be refilled requesting for this before tomorrow is going out of town for medication ALBUTEROL 108 (90 Base) MCG/ACT Inhalation Aero Soln please follow up

## 2025-05-07 ENCOUNTER — APPOINTMENT (OUTPATIENT)
Dept: PHYSICAL THERAPY | Age: 40
End: 2025-05-07
Payer: COMMERCIAL

## 2025-05-12 ENCOUNTER — APPOINTMENT (OUTPATIENT)
Dept: PHYSICAL THERAPY | Age: 40
End: 2025-05-12
Attending: INTERNAL MEDICINE
Payer: COMMERCIAL

## 2025-05-14 ENCOUNTER — TELEPHONE (OUTPATIENT)
Dept: INTERNAL MEDICINE CLINIC | Facility: CLINIC | Age: 40
End: 2025-05-14

## 2025-05-14 ENCOUNTER — PATIENT MESSAGE (OUTPATIENT)
Dept: INTERNAL MEDICINE CLINIC | Facility: CLINIC | Age: 40
End: 2025-05-14
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
## 2025-05-15 NOTE — TELEPHONE ENCOUNTER
From  Shannon Davila RN To  Salome Salamanca Sent and Delivered  5/14/2025  4:41 PM   Last Read in MyChart  5/14/2025  8:16 PM by Salome Salamanca

## 2025-05-16 ENCOUNTER — OFFICE VISIT (OUTPATIENT)
Dept: PHYSICAL THERAPY | Age: 40
End: 2025-05-16
Attending: INTERNAL MEDICINE
Payer: COMMERCIAL

## 2025-05-16 PROCEDURE — 97112 NEUROMUSCULAR REEDUCATION: CPT

## 2025-05-16 PROCEDURE — 97110 THERAPEUTIC EXERCISES: CPT

## 2025-05-17 NOTE — PROGRESS NOTES
Patient: Salome Salamanca (40 year old, female) Referring Provider:  Insurance:   Diagnosis: Injury of plantar plate of left foot, subsequent encounter (R26.258I) No ref. provider found  [place]   Date of Surgery: na Next MD visit:  N/A   Precautions:  None - Referral Information:    Date of Evaluation: Req: 0, Auth: 0, Exp:     04/21/25 POC Auth Visits:  12       Today's Date   5/16/2025    Subjective  Continued pain, especially with standing duration at work.       Pain: 8/10     Objective  See below treatment grid.            Assessment  Continued foot pain and limited tolerance for standing.    Goals (to be met in 12 visits)   Pt report min/no pain  Pt reports tolerating standing at work  Pt demonstrate independence with HEP  Pt report no functional limitations           Plan  Progress balance and foot/ankle strengthening as able.    Treatment Last 4 Visits  Treatment Day: 4 4/21/2025 4/25/2025 4/28/2025 5/16/2025   LE Treatment   Therapeutic Exercise - Discuss daily icing (via submersion)  - Gastroc, soleus and FHL stretch variations  - AROM ankle DF/PF, Inv/Ever, circles, alphabet  - Toe scrunches, yoga/ext, spreading  - SLS on level, eyes open - Recumbent bike L5 x7'  - Gastroc, soleus, FHL stretches  - AROM L ankle  - Toe scrunches  - Toe yoga  - Toe spreading - Recumbent bike L5 x7'   - Gastroc, soleus, FHL stretches   - AROM L ankle   - Toe scrunches   - Toe yoga   - Toe spreading    - Recumbent bike L5 x7'   - Gastroc, soleus, FHL stretches   - AROM L ankle   - Toe scrunches   - Toe yoga   - Toe spreading      Neuro Re-Education  - SLS variations (on level/foam, eyes open/closed)  - BOSU lat step up/overs  - BOSU fwd step up to runners pose. - SLS variations (on level/foam, eyes open/closed, BOSU)   - BOSU lat step up/overs   - BOSU fwd step up to runners pose.    - SLS variations (on level/foam, eyes open/closed, BOSU)   - BOSU lat step up/overs   - BOSU fwd step up to runners pose.       Therapeutic Exercise Minutes 25 20 20 20   Neuro Re-Educ Minutes  25 25 25   Evaluation Minutes 20      Total Time Of Timed Procedures 25 45 45 45   Total Time Of Service-Based Procedures 20 0 0 0   Total Treatment Time 45 45 45 45   HEP Texted to pt:  Access Code: [email or web address]  URL: [email or web address]  Date: 04/21/2025  Prepared by: Robert Mills    Exercises  - Supine Ankle Dorsiflexion and Plantarflexion AROM  - 1 x daily - 7 x weekly - 3 sets - 10 reps  - Supine Ankle Inversion and Eversion AROM  - 1 x daily - 7 x weekly - 3 sets - 10 reps  - Seated Ankle Circles  - 1 x daily - 7 x weekly - 3 sets - 10 reps  - Seated Ankle Alphabet  - 1 x daily - 7 x weekly - 3 sets - 10 reps  - Seated Toe Towel Scrunches  - 1 x daily - 7 x weekly - 3 sets - 10 reps  - Toe Yoga - Alternating Great Toe and Lesser Toe Extension  - 1 x daily - 7 x weekly - 3 sets - 10 reps  - Toe Spreading  - 1 x daily - 7 x weekly - 3 sets - 10 reps  - Gastroc Stretch with Foot at Wall  - 1 x daily - 7 x weekly - 3 sets - 10 reps - 30 seconds hold  - Gastroc Stretch on Wall  - 1 x daily - 7 x weekly - 3 sets - 10 reps - 30 seconds hold  - Soleus Stretch on Wall  - 1 x daily - 7 x weekly - 3 sets - 10 reps - 30 seconds hold  - Single Leg Stance  - 1 x daily - 7 x weekly - 3 sets - 10 reps           HEP  Texted to pt:  Access Code: [email or web address]  URL: [email or web address]  Date: 04/21/2025  Prepared by: Robert Mills    Exercises  - Supine Ankle Dorsiflexion and Plantarflexion AROM  - 1 x daily - 7 x weekly - 3 sets - 10 reps  - Supine Ankle Inversion and Eversion AROM  - 1 x daily - 7 x weekly - 3 sets - 10 reps  - Seated Ankle Circles  - 1 x daily - 7 x weekly - 3 sets - 10 reps  - Seated Ankle Alphabet  - 1 x daily - 7 x weekly - 3 sets - 10 reps  - Seated Toe Towel Scrunches  - 1 x daily - 7 x weekly - 3 sets - 10 reps  - Toe Yoga - Alternating Great Toe and Lesser Toe Extension  - 1 x daily - 7 x weekly - 3  sets - 10 reps  - Toe Spreading  - 1 x daily - 7 x weekly - 3 sets - 10 reps  - Gastroc Stretch with Foot at Wall  - 1 x daily - 7 x weekly - 3 sets - 10 reps - 30 seconds hold  - Gastroc Stretch on Wall  - 1 x daily - 7 x weekly - 3 sets - 10 reps - 30 seconds hold  - Soleus Stretch on Wall  - 1 x daily - 7 x weekly - 3 sets - 10 reps - 30 seconds hold  - Single Leg Stance  - 1 x daily - 7 x weekly - 3 sets - 10 reps    Charges     Ther Ex, Neuro Re-Ed x2

## 2025-05-19 ENCOUNTER — TELEPHONE (OUTPATIENT)
Dept: PHYSICAL THERAPY | Age: 40
End: 2025-05-19

## 2025-05-20 ENCOUNTER — OFFICE VISIT (OUTPATIENT)
Dept: INTERNAL MEDICINE CLINIC | Facility: CLINIC | Age: 40
End: 2025-05-20

## 2025-05-20 VITALS
SYSTOLIC BLOOD PRESSURE: 128 MMHG | HEART RATE: 74 BPM | HEIGHT: 71 IN | DIASTOLIC BLOOD PRESSURE: 84 MMHG | WEIGHT: 288 LBS | BODY MASS INDEX: 40.32 KG/M2

## 2025-05-20 DIAGNOSIS — M25.561 CHRONIC PAIN OF RIGHT KNEE: Primary | ICD-10-CM

## 2025-05-20 DIAGNOSIS — G89.29 CHRONIC PAIN OF RIGHT KNEE: Primary | ICD-10-CM

## 2025-05-20 PROCEDURE — 3079F DIAST BP 80-89 MM HG: CPT | Performed by: NURSE PRACTITIONER

## 2025-05-20 PROCEDURE — 3074F SYST BP LT 130 MM HG: CPT | Performed by: NURSE PRACTITIONER

## 2025-05-20 PROCEDURE — 3008F BODY MASS INDEX DOCD: CPT | Performed by: NURSE PRACTITIONER

## 2025-05-20 PROCEDURE — 99214 OFFICE O/P EST MOD 30 MIN: CPT | Performed by: NURSE PRACTITIONER

## 2025-05-20 RX ORDER — MELOXICAM 7.5 MG/1
7.5 TABLET ORAL
Qty: 30 TABLET | Refills: 0 | Status: SHIPPED | OUTPATIENT
Start: 2025-05-20
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
## 2025-05-21 ENCOUNTER — APPOINTMENT (OUTPATIENT)
Dept: PHYSICAL THERAPY | Age: 40
End: 2025-05-21
Attending: INTERNAL MEDICINE
Payer: COMMERCIAL

## 2025-05-28 ENCOUNTER — TELEPHONE (OUTPATIENT)
Dept: PHYSICAL THERAPY | Age: 40
End: 2025-05-28

## 2025-05-30 ENCOUNTER — OFFICE VISIT (OUTPATIENT)
Dept: PHYSICAL THERAPY | Age: 40
End: 2025-05-30
Attending: INTERNAL MEDICINE
Payer: COMMERCIAL

## 2025-05-30 PROCEDURE — 97110 THERAPEUTIC EXERCISES: CPT

## 2025-05-30 PROCEDURE — 97112 NEUROMUSCULAR REEDUCATION: CPT

## 2025-06-01 NOTE — PROGRESS NOTES
Patient: Salome Salamanca (40 year old, female) Referring Provider:  Insurance:   Diagnosis: Injury of plantar plate of left foot, subsequent encounter (U12.989B) No ref. provider found  [place]   Date of Surgery: na Next MD visit:  N/A   Precautions:  None - Referral Information:    Date of Evaluation: Req: 0, Auth: 0, Exp:     04/21/25 POC Auth Visits:  12       Today's Date   5/30/2025    Subjective  Continued L foot pain, not really changing and always bothering her especially with upright activity (work, walk, chores around home).  R knee continues to be problematic too and is scheduled for Ortho eval.       Pain: 8/10     Objective  See below treatment grid.            Assessment  Continued foot pain and limited tolerance for standing.    Goals (to be met in 12 visits)   Pt report min/no pain  Pt reports tolerating standing at work  Pt demonstrate independence with HEP  Pt report no functional limitations             Plan  Progress balance and foot/ankle strengthening as able.    Treatment Last 4 Visits  Treatment Day: 5 4/25/2025 4/28/2025 5/16/2025 5/30/2025   LE Treatment   Therapeutic Exercise - Recumbent bike L5 x7'  - Gastroc, soleus, FHL stretches  - AROM L ankle  - Toe scrunches  - Toe yoga  - Toe spreading - Recumbent bike L5 x7'   - Gastroc, soleus, FHL stretches   - AROM L ankle   - Toe scrunches   - Toe yoga   - Toe spreading    - Recumbent bike L5 x7'   - Gastroc, soleus, FHL stretches   - AROM L ankle   - Toe scrunches   - Toe yoga   - Toe spreading    - Recumbent bike L5 x7'   - Gastroc, soleus, FHL stretches   - AROM L ankle   - Toe scrunches   - Toe yoga   - Toe spreading      Neuro Re-Education - SLS variations (on level/foam, eyes open/closed)  - BOSU lat step up/overs  - BOSU fwd step up to runners pose. - SLS variations (on level/foam, eyes open/closed, BOSU)   - BOSU lat step up/overs   - BOSU fwd step up to runners pose.    - SLS variations (on level/foam, eyes open/closed, BOSU)    - BOSU lat step up/overs   - BOSU fwd step up to runners pose.    - SLS variations (on level/foam, eyes open/closed, BOSU)   - BOSU lat step up/overs   - BOSU fwd step up to runners pose.      Therapeutic Exercise Minutes 20 20 20 25   Neuro Re-Educ Minutes 25 25 25 20   Total Time Of Timed Procedures 45 45 45 45   Total Time Of Service-Based Procedures 0 0 0 0   Total Treatment Time 45 45 45 45        HEP  Texted to pt:  Access Code: [email or web address]  URL: [email or web address]  Date: 04/21/2025  Prepared by: Robert Mills    Exercises  - Supine Ankle Dorsiflexion and Plantarflexion AROM  - 1 x daily - 7 x weekly - 3 sets - 10 reps  - Supine Ankle Inversion and Eversion AROM  - 1 x daily - 7 x weekly - 3 sets - 10 reps  - Seated Ankle Circles  - 1 x daily - 7 x weekly - 3 sets - 10 reps  - Seated Ankle Alphabet  - 1 x daily - 7 x weekly - 3 sets - 10 reps  - Seated Toe Towel Scrunches  - 1 x daily - 7 x weekly - 3 sets - 10 reps  - Toe Yoga - Alternating Great Toe and Lesser Toe Extension  - 1 x daily - 7 x weekly - 3 sets - 10 reps  - Toe Spreading  - 1 x daily - 7 x weekly - 3 sets - 10 reps  - Gastroc Stretch with Foot at Wall  - 1 x daily - 7 x weekly - 3 sets - 10 reps - 30 seconds hold  - Gastroc Stretch on Wall  - 1 x daily - 7 x weekly - 3 sets - 10 reps - 30 seconds hold  - Soleus Stretch on Wall  - 1 x daily - 7 x weekly - 3 sets - 10 reps - 30 seconds hold  - Single Leg Stance  - 1 x daily - 7 x weekly - 3 sets - 10 reps    Charges     Ther Ex x2, Neuro Re-Ed

## 2025-06-11 ENCOUNTER — TELEPHONE (OUTPATIENT)
Dept: PHYSICAL THERAPY | Age: 40
End: 2025-06-11

## 2025-06-18 ENCOUNTER — APPOINTMENT (OUTPATIENT)
Dept: PHYSICAL THERAPY | Age: 40
End: 2025-06-18
Attending: INTERNAL MEDICINE
Payer: COMMERCIAL

## 2025-06-25 ENCOUNTER — OFFICE VISIT (OUTPATIENT)
Dept: PHYSICAL THERAPY | Age: 40
End: 2025-06-25
Attending: INTERNAL MEDICINE
Payer: COMMERCIAL

## 2025-06-25 PROCEDURE — 97112 NEUROMUSCULAR REEDUCATION: CPT

## 2025-06-25 PROCEDURE — 97110 THERAPEUTIC EXERCISES: CPT

## 2025-06-25 PROCEDURE — 97140 MANUAL THERAPY 1/> REGIONS: CPT

## 2025-06-25 NOTE — PROGRESS NOTES
Patient: Salome Salamanca (40 year old, female) Referring Provider:  Insurance:   Diagnosis: Injury of plantar plate of left foot, subsequent encounter (A04.354P) No ref. provider found  [place]   Date of Surgery: na Next MD visit:  N/A   Precautions:  None - Referral Information:    Date of Evaluation: Req: 0, Auth: 0, Exp:     04/21/25 POC Auth Visits:  12       Today's Date   6/25/2025    Subjective  Back from vacation.  Was able to hike in [place] despite pain L foot, R knee.  Back to work this week.  Her new job role at work requires more walking than standing which was helpful before vacation, so we'll see.       Pain: 8/10     Objective  See below treatment grid.            Assessment  Severe TTP and apprehension ball of L foot.  Good mobility despite c/o pain.    Goals (to be met in 12 visits)   Pt report min/no pain  Pt reports tolerating standing at work  Pt demonstrate independence with HEP  Pt report no functional limitations               Plan  Progress balance and foot/ankle strengthening as able.    Treatment Last 4 Visits  Treatment Day: 6       4/28/2025 5/16/2025 5/30/2025 6/25/2025   LE Treatment   Therapeutic Exercise - Recumbent bike L5 x7'   - Gastroc, soleus, FHL stretches   - AROM L ankle   - Toe scrunches   - Toe yoga   - Toe spreading    - Recumbent bike L5 x7'   - Gastroc, soleus, FHL stretches   - AROM L ankle   - Toe scrunches   - Toe yoga   - Toe spreading    - Recumbent bike L5 x7'   - Gastroc, soleus, FHL stretches   - AROM L ankle   - Toe scrunches   - Toe yoga   - Toe spreading    - Recumbent bike L5 x7'   - Gastroc, soleus, FHL stretches   - AROM L ankle   - HEP Toe scrunches   - HEP Toe yoga   - HEP Toe spreading      Neuro Re-Education - SLS variations (on level/foam, eyes open/closed, BOSU)   - BOSU lat step up/overs   - BOSU fwd step up to runners pose.    - SLS variations (on level/foam, eyes open/closed, BOSU)   - BOSU lat step up/overs   - BOSU fwd step up to runners pose.    -  SLS variations (on level/foam, eyes open/closed, BOSU)   - BOSU lat step up/overs   - BOSU fwd step up to runners pose.    - SLS variations (on level/foam, eyes open/closed, BOSU)   - BOSU lat step up/overs   - BOSU fwd step up to runners pose.      Manual Therapy    - Joint mobilization mid and forefoot L  - Stretch of transverse arch L   - Stretch of toes, MTP's  - gentle STM ball of foot L   Therapeutic Exercise Minutes 20 20 25 15   Neuro Re-Educ Minutes 25 25 20 15   Manual Therapy Minutes    15   Total Time Of Timed Procedures 45 45 45 45   Total Time Of Service-Based Procedures 0 0 0 0   Total Treatment Time 45 45 45 45        HEP  Texted to pt:  Access Code: [email or web address]  URL: [email or web address]  Date: 04/21/2025  Prepared by: Robert Mills    Exercises  - Supine Ankle Dorsiflexion and Plantarflexion AROM  - 1 x daily - 7 x weekly - 3 sets - 10 reps  - Supine Ankle Inversion and Eversion AROM  - 1 x daily - 7 x weekly - 3 sets - 10 reps  - Seated Ankle Circles  - 1 x daily - 7 x weekly - 3 sets - 10 reps  - Seated Ankle Alphabet  - 1 x daily - 7 x weekly - 3 sets - 10 reps  - Seated Toe Towel Scrunches  - 1 x daily - 7 x weekly - 3 sets - 10 reps  - Toe Yoga - Alternating Great Toe and Lesser Toe Extension  - 1 x daily - 7 x weekly - 3 sets - 10 reps  - Toe Spreading  - 1 x daily - 7 x weekly - 3 sets - 10 reps  - Gastroc Stretch with Foot at Wall  - 1 x daily - 7 x weekly - 3 sets - 10 reps - 30 seconds hold  - Gastroc Stretch on Wall  - 1 x daily - 7 x weekly - 3 sets - 10 reps - 30 seconds hold  - Soleus Stretch on Wall  - 1 x daily - 7 x weekly - 3 sets - 10 reps - 30 seconds hold  - Single Leg Stance  - 1 x daily - 7 x weekly - 3 sets - 10 reps    Charges     Ther Ex, Neuro Re-Ed, Manual

## 2025-07-02 ENCOUNTER — OFFICE VISIT (OUTPATIENT)
Dept: PHYSICAL THERAPY | Age: 40
End: 2025-07-02
Attending: INTERNAL MEDICINE
Payer: COMMERCIAL

## 2025-07-02 PROCEDURE — 97112 NEUROMUSCULAR REEDUCATION: CPT

## 2025-07-02 PROCEDURE — 97140 MANUAL THERAPY 1/> REGIONS: CPT

## 2025-07-02 PROCEDURE — 97110 THERAPEUTIC EXERCISES: CPT

## 2025-07-02 NOTE — PROGRESS NOTES
Patient: Salome Salamanca (40 year old, female) Referring Provider:  Insurance:   Diagnosis: Injury of plantar plate of left foot, subsequent encounter (F10.017E) No ref. provider found  [place]   Date of Surgery: na Next MD visit:  N/A   Precautions:  None - Referral Information:    Date of Evaluation: Req: 0, Auth: 0, Exp:     04/21/25 POC Auth Visits:  12       Today's Date   7/2/2025    Subjective  Symptoms persist.  Usually she starts her work day without L foot pain, but it doesn't take much stand/walking to irritate it, then it is painful for the rest of the day.  R knee continues to be problematic too.       Pain: 8/10     Objective  See below treatment grid.            Assessment  Continued severe TTP.    Goals (to be met in 12 visits)   Pt report min/no pain  Pt reports tolerating standing at work  Pt demonstrate independence with HEP  Pt report no functional limitations                 Plan  Progress balance and foot/ankle strengthening as able.    Treatment Last 4 Visits  Treatment Day: 7 5/16/2025 5/30/2025 6/25/2025 7/2/2025   LE Treatment   Therapeutic Exercise - Recumbent bike L5 x7'   - Gastroc, soleus, FHL stretches   - AROM L ankle   - Toe scrunches   - Toe yoga   - Toe spreading    - Recumbent bike L5 x7'   - Gastroc, soleus, FHL stretches   - AROM L ankle   - Toe scrunches   - Toe yoga   - Toe spreading    - Recumbent bike L5 x7'   - Gastroc, soleus, FHL stretches   - AROM L ankle   - HEP Toe scrunches   - HEP Toe yoga   - HEP Toe spreading    - Recumbent bike L5 x7'   - Gastroc, soleus, FHL stretches   - AROM L ankle   - HEP Toe scrunches   - HEP Toe yoga   - HEP Toe spreading      Neuro Re-Education - SLS variations (on level/foam, eyes open/closed, BOSU)   - BOSU lat step up/overs   - BOSU fwd step up to runners pose.    - SLS variations (on level/foam, eyes open/closed, BOSU)   - BOSU lat step up/overs   - BOSU fwd step up to runners pose.    - SLS variations (on level/foam, eyes  open/closed, BOSU)   - BOSU lat step up/overs   - BOSU fwd step up to runners pose.    - SLS variations (on level/foam, eyes open/closed, BOSU)   - BOSU lat step up/overs   - BOSU fwd step up to runners pose.      Manual Therapy   - Joint mobilization mid and forefoot L  - Stretch of transverse arch L   - Stretch of toes, MTP's  - gentle STM ball of foot L - Joint mobilization mid and forefoot L   - Stretch of transverse arch L   - Stretch of toes, MTP's   - gentle STM ball of foot L      Therapeutic Exercise Minutes 20 25 15 15   Neuro Re-Educ Minutes 25 20 15 15   Manual Therapy Minutes   15 15   Total Time Of Timed Procedures 45 45 45 45   Total Time Of Service-Based Procedures 0 0 0 0   Total Treatment Time 45 45 45 45        HEP  Texted to pt:  Access Code: [email or web address]  URL: [email or web address]  Date: 04/21/2025  Prepared by: Robert Mills    Exercises  - Supine Ankle Dorsiflexion and Plantarflexion AROM  - 1 x daily - 7 x weekly - 3 sets - 10 reps  - Supine Ankle Inversion and Eversion AROM  - 1 x daily - 7 x weekly - 3 sets - 10 reps  - Seated Ankle Circles  - 1 x daily - 7 x weekly - 3 sets - 10 reps  - Seated Ankle Alphabet  - 1 x daily - 7 x weekly - 3 sets - 10 reps  - Seated Toe Towel Scrunches  - 1 x daily - 7 x weekly - 3 sets - 10 reps  - Toe Yoga - Alternating Great Toe and Lesser Toe Extension  - 1 x daily - 7 x weekly - 3 sets - 10 reps  - Toe Spreading  - 1 x daily - 7 x weekly - 3 sets - 10 reps  - Gastroc Stretch with Foot at Wall  - 1 x daily - 7 x weekly - 3 sets - 10 reps - 30 seconds hold  - Gastroc Stretch on Wall  - 1 x daily - 7 x weekly - 3 sets - 10 reps - 30 seconds hold  - Soleus Stretch on Wall  - 1 x daily - 7 x weekly - 3 sets - 10 reps - 30 seconds hold  - Single Leg Stance  - 1 x daily - 7 x weekly - 3 sets - 10 reps    Charges     Ther Ex, Neuro Re-Ed, Manual

## 2025-07-07 ENCOUNTER — TELEPHONE (OUTPATIENT)
Dept: PHYSICAL THERAPY | Facility: HOSPITAL | Age: 40
End: 2025-07-07

## 2025-07-08 ENCOUNTER — TELEPHONE (OUTPATIENT)
Dept: PHYSICAL THERAPY | Facility: HOSPITAL | Age: 40
End: 2025-07-08

## 2025-07-09 ENCOUNTER — TELEPHONE (OUTPATIENT)
Dept: PHYSICAL THERAPY | Age: 40
End: 2025-07-09

## 2025-07-09 DIAGNOSIS — M17.11 PRIMARY OSTEOARTHRITIS OF RIGHT KNEE: Primary | ICD-10-CM

## 2025-07-10 ENCOUNTER — TELEPHONE (OUTPATIENT)
Dept: PHYSICAL THERAPY | Age: 40
End: 2025-07-10

## 2025-07-16 ENCOUNTER — PATIENT MESSAGE (OUTPATIENT)
Dept: PULMONOLOGY | Facility: CLINIC | Age: 40
End: 2025-07-16

## 2025-07-16 DIAGNOSIS — J45.41 MODERATE PERSISTENT ASTHMA WITH ACUTE EXACERBATION (HCC): ICD-10-CM

## 2025-07-17 RX ORDER — ALBUTEROL SULFATE 90 UG/1
2 INHALANT RESPIRATORY (INHALATION) EVERY 4 HOURS PRN
Qty: 3 EACH | Refills: 1 | OUTPATIENT
Start: 2025-07-17

## 2025-07-21 DIAGNOSIS — J45.41 MODERATE PERSISTENT ASTHMA WITH ACUTE EXACERBATION (HCC): ICD-10-CM

## 2025-07-21 RX ORDER — ALBUTEROL SULFATE 90 UG/1
2 INHALANT RESPIRATORY (INHALATION) EVERY 4 HOURS PRN
Qty: 3 EACH | Refills: 1 | Status: SHIPPED | OUTPATIENT
Start: 2025-07-21

## 2025-07-21 NOTE — TELEPHONE ENCOUNTER
Last office visit 9/30/24  Last refill 5/5/25, quantity 3 with 1 refill.     Spoke with patient. Patient states she has been using albuterol more often. Patient stopped using albuterol for 2 days and has been having intermittent shortness of breath/wheezing. Patient tries to use Wixela, tries to take 1 puff a day. Patient states she will take Wixela as prescribed but requesting refill for albuterol. Follow-up appointment also scheduled for 8/25/25 at 1:45PM. Confirmed date, time, place. Order pended if agreeable.

## 2025-07-21 NOTE — TELEPHONE ENCOUNTER
Patient needs refill albuterol 108 (90 Base) MCG/ACT Inhalation [facility] is out of it please follow up

## 2025-07-23 ENCOUNTER — APPOINTMENT (OUTPATIENT)
Dept: PHYSICAL THERAPY | Age: 40
End: 2025-07-23
Attending: INTERNAL MEDICINE
Payer: COMMERCIAL

## 2025-07-25 ENCOUNTER — OFFICE VISIT (OUTPATIENT)
Dept: PHYSICAL THERAPY | Age: 40
End: 2025-07-25
Attending: INTERNAL MEDICINE
Payer: COMMERCIAL

## 2025-07-25 PROCEDURE — 97110 THERAPEUTIC EXERCISES: CPT

## 2025-07-25 PROCEDURE — 97112 NEUROMUSCULAR REEDUCATION: CPT

## 2025-07-25 PROCEDURE — 97140 MANUAL THERAPY 1/> REGIONS: CPT

## 2025-07-25 NOTE — PROGRESS NOTES
Patient: Salome Salamanca (40 year old, female) Referring Provider:  Insurance:   Diagnosis: Injury of plantar plate of left foot, subsequent encounter (C02.793F) [employer]   Date of Surgery: na Next MD visit:  N/A   Precautions:  None - Referral Information:    Date of Evaluation: Req: 0, Auth: 0, Exp:     04/21/25 POC Auth Visits:  12       Today's Date   7/25/2025    Subjective  Generally, no improvement, but is optimistic about getting her insoles next week.       Pain: 8/10     Objective  See below treatment grid.            Assessment  Continued pain especially with/after work.    Goals (to be met in 12 visits)   Pt report min/no pain  Pt reports tolerating standing at work  Pt demonstrate independence with HEP  Pt report no functional limitations                   Plan  Progress balance and foot/ankle strengthening as able.    Treatment Last 4 Visits  Treatment Day: 9       5/30/2025 6/25/2025 7/2/2025 7/25/2025   LE Treatment   Therapeutic Exercise - Recumbent bike L5 x7'   - Gastroc, soleus, FHL stretches   - AROM L ankle   - Toe scrunches   - Toe yoga   - Toe spreading    - Recumbent bike L5 x7'   - Gastroc, soleus, FHL stretches   - AROM L ankle   - HEP Toe scrunches   - HEP Toe yoga   - HEP Toe spreading    - Recumbent bike L5 x7'   - Gastroc, soleus, FHL stretches   - AROM L ankle   - HEP Toe scrunches   - HEP Toe yoga   - HEP Toe spreading    - Recumbent bike L5 x7'   - Gastroc, soleus, FHL stretches   - AROM L ankle   - HEP Toe scrunches   - HEP Toe yoga   - HEP Toe spreading      Neuro Re-Education - SLS variations (on level/foam, eyes open/closed, BOSU)   - BOSU lat step up/overs   - BOSU fwd step up to runners pose.    - SLS variations (on level/foam, eyes open/closed, BOSU)   - BOSU lat step up/overs   - BOSU fwd step up to runners pose.    - SLS variations (on level/foam, eyes open/closed, BOSU)   - BOSU lat step up/overs   - BOSU fwd step up to runners pose.    - SLS variations  (on level/foam, eyes open/closed, BOSU)   - BOSU lat step up/overs   - BOSU fwd step up to runners pose.      Manual Therapy  - Joint mobilization mid and forefoot L  - Stretch of transverse arch L   - Stretch of toes, MTP's  - gentle STM ball of foot L - Joint mobilization mid and forefoot L   - Stretch of transverse arch L   - Stretch of toes, MTP's   - gentle STM ball of foot L    - Joint mobilization mid and forefoot L   - Stretch of transverse arch L   - Stretch of toes, MTP's   - gentle STM ball of foot L      Therapeutic Exercise Minutes 25 15 15 15   Neuro Re-Educ Minutes 20 15 15 15   Manual Therapy Minutes  15 15 15   Total Time Of Timed Procedures 45 45 45 45   Total Time Of Service-Based Procedures 0 0 0 0   Total Treatment Time 45 45 45 45        HEP  Texted to pt:  Access Code: [email or web address]  URL: [email or web address]  Date: 04/21/2025  Prepared by: Robert Mills    Exercises  - Supine Ankle Dorsiflexion and Plantarflexion AROM  - 1 x daily - 7 x weekly - 3 sets - 10 reps  - Supine Ankle Inversion and Eversion AROM  - 1 x daily - 7 x weekly - 3 sets - 10 reps  - Seated Ankle Circles  - 1 x daily - 7 x weekly - 3 sets - 10 reps  - Seated Ankle Alphabet  - 1 x daily - 7 x weekly - 3 sets - 10 reps  - Seated Toe Towel Scrunches  - 1 x daily - 7 x weekly - 3 sets - 10 reps  - Toe Yoga - Alternating Great Toe and Lesser Toe Extension  - 1 x daily - 7 x weekly - 3 sets - 10 reps  - Toe Spreading  - 1 x daily - 7 x weekly - 3 sets - 10 reps  - Gastroc Stretch with Foot at Wall  - 1 x daily - 7 x weekly - 3 sets - 10 reps - 30 seconds hold  - Gastroc Stretch on Wall  - 1 x daily - 7 x weekly - 3 sets - 10 reps - 30 seconds hold  - Soleus Stretch on Wall  - 1 x daily - 7 x weekly - 3 sets - 10 reps - 30 seconds hold  - Single Leg Stance  - 1 x daily - 7 x weekly - 3 sets - 10 reps    Charges     Ther Ex, Neuro Re-Ed, Manual

## 2025-07-28 ENCOUNTER — TELEPHONE (OUTPATIENT)
Dept: INTERNAL MEDICINE CLINIC | Facility: CLINIC | Age: 40
End: 2025-07-28

## 2025-07-28 ENCOUNTER — TELEPHONE (OUTPATIENT)
Dept: PHYSICAL THERAPY | Facility: HOSPITAL | Age: 40
End: 2025-07-28

## 2025-08-01 ENCOUNTER — TELEPHONE (OUTPATIENT)
Dept: INTERNAL MEDICINE CLINIC | Facility: CLINIC | Age: 40
End: 2025-08-01

## 2025-08-04 ENCOUNTER — TELEPHONE (OUTPATIENT)
Dept: PHYSICAL THERAPY | Facility: HOSPITAL | Age: 40
End: 2025-08-04

## 2025-08-06 ENCOUNTER — APPOINTMENT (OUTPATIENT)
Dept: PHYSICAL THERAPY | Age: 40
End: 2025-08-06
Attending: ORTHOPAEDIC SURGERY

## 2025-08-10 ENCOUNTER — TELEPHONE (OUTPATIENT)
Dept: INTERNAL MEDICINE CLINIC | Facility: CLINIC | Age: 40
End: 2025-08-10

## 2025-08-10 DIAGNOSIS — J45.41 MODERATE PERSISTENT ASTHMA WITH ACUTE EXACERBATION (HCC): ICD-10-CM

## 2025-08-13 ENCOUNTER — APPOINTMENT (OUTPATIENT)
Dept: PHYSICAL THERAPY | Age: 40
End: 2025-08-13
Attending: ORTHOPAEDIC SURGERY

## 2025-08-13 ENCOUNTER — TELEPHONE (OUTPATIENT)
Dept: PHYSICAL THERAPY | Facility: HOSPITAL | Age: 40
End: 2025-08-13

## 2025-08-13 RX ORDER — FLUTICASONE PROPIONATE AND SALMETEROL 250; 50 UG/1; UG/1
1 POWDER RESPIRATORY (INHALATION) 2 TIMES DAILY
Qty: 180 EACH | Refills: 0 | Status: SHIPPED | OUTPATIENT
Start: 2025-08-13

## 2025-08-20 ENCOUNTER — OFFICE VISIT (OUTPATIENT)
Dept: PHYSICAL THERAPY | Age: 40
End: 2025-08-20
Attending: ORTHOPAEDIC SURGERY

## 2025-08-20 DIAGNOSIS — M17.11 PRIMARY OSTEOARTHRITIS OF RIGHT KNEE: Primary | ICD-10-CM

## 2025-08-20 PROCEDURE — 97161 PT EVAL LOW COMPLEX 20 MIN: CPT

## 2025-08-20 PROCEDURE — 97112 NEUROMUSCULAR REEDUCATION: CPT

## 2025-08-20 PROCEDURE — 97110 THERAPEUTIC EXERCISES: CPT

## 2025-08-25 ENCOUNTER — OFFICE VISIT (OUTPATIENT)
Dept: PULMONOLOGY | Facility: CLINIC | Age: 40
End: 2025-08-25

## 2025-08-25 VITALS
DIASTOLIC BLOOD PRESSURE: 84 MMHG | SYSTOLIC BLOOD PRESSURE: 127 MMHG | BODY MASS INDEX: 40.18 KG/M2 | WEIGHT: 287 LBS | HEIGHT: 71 IN | RESPIRATION RATE: 14 BRPM | OXYGEN SATURATION: 96 % | HEART RATE: 76 BPM

## 2025-08-25 DIAGNOSIS — G47.33 OSA ON CPAP: ICD-10-CM

## 2025-08-25 DIAGNOSIS — J45.909 PERSISTENT ASTHMA WITHOUT COMPLICATION, UNSPECIFIED ASTHMA SEVERITY (HCC): Primary | ICD-10-CM

## 2025-08-25 PROCEDURE — 99213 OFFICE O/P EST LOW 20 MIN: CPT | Performed by: INTERNAL MEDICINE

## 2025-08-25 PROCEDURE — 3074F SYST BP LT 130 MM HG: CPT | Performed by: INTERNAL MEDICINE

## 2025-08-25 PROCEDURE — 3008F BODY MASS INDEX DOCD: CPT | Performed by: INTERNAL MEDICINE

## 2025-08-25 PROCEDURE — 3079F DIAST BP 80-89 MM HG: CPT | Performed by: INTERNAL MEDICINE

## 2025-08-27 ENCOUNTER — OFFICE VISIT (OUTPATIENT)
Dept: PHYSICAL THERAPY | Age: 40
End: 2025-08-27
Attending: ORTHOPAEDIC SURGERY

## 2025-08-27 PROCEDURE — 97110 THERAPEUTIC EXERCISES: CPT

## 2025-08-27 PROCEDURE — 97112 NEUROMUSCULAR REEDUCATION: CPT

## (undated) DEVICE — Device

## (undated) DEVICE — LAWSON - BANDAGE ESMARK STL LF 4INX12FT

## (undated) DEVICE — LAWSON - SPONGE GAUZE 4X4 12PLY STL 10S

## (undated) NOTE — LETTER
Medical Grade Compression Hose        Patient: Salome Salamanca      YOB: 1985           Diagnosis: Varicose Veins with Pain- Bilateral: I83.813       Compression: 20-30mmHg- Moderate  Style: Thigh-High        Amount: X 2  HCPS: - Thigh High          Physician Signature: _____________________  Date:  03/20/24                                        Lucy Muniz APRN